# Patient Record
Sex: MALE | Race: WHITE | NOT HISPANIC OR LATINO | Employment: OTHER | URBAN - METROPOLITAN AREA
[De-identification: names, ages, dates, MRNs, and addresses within clinical notes are randomized per-mention and may not be internally consistent; named-entity substitution may affect disease eponyms.]

---

## 2017-03-16 ENCOUNTER — HISTORICAL (OUTPATIENT)
Dept: LAB | Facility: HOSPITAL | Age: 77
End: 2017-03-16

## 2017-07-27 ENCOUNTER — HISTORICAL (OUTPATIENT)
Dept: ADMINISTRATIVE | Facility: HOSPITAL | Age: 77
End: 2017-07-27

## 2017-08-15 ENCOUNTER — HISTORICAL (OUTPATIENT)
Dept: LAB | Facility: HOSPITAL | Age: 77
End: 2017-08-15

## 2017-08-17 LAB — FINAL CULTURE: NO GROWTH

## 2018-05-23 ENCOUNTER — HISTORICAL (OUTPATIENT)
Dept: ADMINISTRATIVE | Facility: HOSPITAL | Age: 78
End: 2018-05-23

## 2018-08-02 ENCOUNTER — HISTORICAL (OUTPATIENT)
Dept: ADMINISTRATIVE | Facility: HOSPITAL | Age: 78
End: 2018-08-02

## 2018-12-17 ENCOUNTER — HISTORICAL (OUTPATIENT)
Dept: ADMINISTRATIVE | Facility: HOSPITAL | Age: 78
End: 2018-12-17

## 2019-01-04 ENCOUNTER — HISTORICAL (OUTPATIENT)
Dept: ADMINISTRATIVE | Facility: HOSPITAL | Age: 79
End: 2019-01-04

## 2019-02-15 ENCOUNTER — HISTORICAL (OUTPATIENT)
Dept: RADIOLOGY | Facility: HOSPITAL | Age: 79
End: 2019-02-15

## 2019-05-22 ENCOUNTER — HISTORICAL (OUTPATIENT)
Dept: ADMINISTRATIVE | Facility: HOSPITAL | Age: 79
End: 2019-05-22

## 2019-06-04 ENCOUNTER — HISTORICAL (OUTPATIENT)
Dept: RADIOLOGY | Facility: HOSPITAL | Age: 79
End: 2019-06-04

## 2019-07-29 ENCOUNTER — HISTORICAL (OUTPATIENT)
Dept: ADMINISTRATIVE | Facility: HOSPITAL | Age: 79
End: 2019-07-29

## 2020-05-06 ENCOUNTER — HISTORICAL (OUTPATIENT)
Dept: ADMINISTRATIVE | Facility: HOSPITAL | Age: 80
End: 2020-05-06

## 2020-06-30 ENCOUNTER — HISTORICAL (OUTPATIENT)
Dept: RADIOLOGY | Facility: HOSPITAL | Age: 80
End: 2020-06-30

## 2021-05-14 ENCOUNTER — HISTORICAL (OUTPATIENT)
Dept: ADMINISTRATIVE | Facility: HOSPITAL | Age: 81
End: 2021-05-14

## 2021-05-18 ENCOUNTER — HISTORICAL (OUTPATIENT)
Dept: ADMINISTRATIVE | Facility: HOSPITAL | Age: 81
End: 2021-05-18

## 2022-04-09 ENCOUNTER — HISTORICAL (OUTPATIENT)
Dept: ADMINISTRATIVE | Facility: HOSPITAL | Age: 82
End: 2022-04-09

## 2022-04-26 VITALS
DIASTOLIC BLOOD PRESSURE: 78 MMHG | WEIGHT: 245.38 LBS | BODY MASS INDEX: 48.17 KG/M2 | SYSTOLIC BLOOD PRESSURE: 140 MMHG | HEIGHT: 60 IN

## 2022-07-11 ENCOUNTER — LAB VISIT (OUTPATIENT)
Dept: LAB | Facility: HOSPITAL | Age: 82
End: 2022-07-11
Attending: INTERNAL MEDICINE
Payer: MEDICARE

## 2022-07-11 DIAGNOSIS — Z12.5 SPECIAL SCREENING FOR MALIGNANT NEOPLASM OF PROSTATE: ICD-10-CM

## 2022-07-11 DIAGNOSIS — E11.9 DIABETES MELLITUS WITHOUT COMPLICATION: ICD-10-CM

## 2022-07-11 DIAGNOSIS — M81.0 SENILE OSTEOPOROSIS: ICD-10-CM

## 2022-07-11 DIAGNOSIS — Z00.00 ROUTINE GENERAL MEDICAL EXAMINATION AT A HEALTH CARE FACILITY: ICD-10-CM

## 2022-07-11 DIAGNOSIS — R94.8 NONSPECIFIC ABNORMAL RESULTS OF BASAL METABOLISM FUNCTION STUDY: ICD-10-CM

## 2022-07-11 DIAGNOSIS — N40.0 BENIGN PROSTATIC HYPERPLASIA, UNSPECIFIED WHETHER LOWER URINARY TRACT SYMPTOMS PRESENT: Primary | ICD-10-CM

## 2022-07-11 LAB
ALBUMIN SERPL-MCNC: 3.6 GM/DL (ref 3.4–4.8)
ALBUMIN/GLOB SERPL: 1.1 RATIO (ref 1.1–2)
ALP SERPL-CCNC: 106 UNIT/L (ref 40–150)
ALT SERPL-CCNC: 12 UNIT/L (ref 0–55)
AST SERPL-CCNC: 17 UNIT/L (ref 5–34)
BASOPHILS # BLD AUTO: 0.05 X10(3)/MCL (ref 0–0.2)
BASOPHILS NFR BLD AUTO: 0.7 %
BILIRUBIN DIRECT+TOT PNL SERPL-MCNC: 0.4 MG/DL
BUN SERPL-MCNC: 15.3 MG/DL (ref 8.4–25.7)
CALCIUM SERPL-MCNC: 9.2 MG/DL (ref 8.8–10)
CHLORIDE SERPL-SCNC: 115 MMOL/L (ref 98–107)
CHOLEST SERPL-MCNC: 124 MG/DL
CHOLEST/HDLC SERPL: 4 {RATIO} (ref 0–5)
CO2 SERPL-SCNC: 20 MMOL/L (ref 23–31)
CREAT SERPL-MCNC: 1.03 MG/DL (ref 0.73–1.18)
DEPRECATED CALCIDIOL+CALCIFEROL SERPL-MC: 22.2 NG/ML (ref 30–80)
EOSINOPHIL # BLD AUTO: 0.12 X10(3)/MCL (ref 0–0.9)
EOSINOPHIL NFR BLD AUTO: 1.7 %
ERYTHROCYTE [DISTWIDTH] IN BLOOD BY AUTOMATED COUNT: 15.2 % (ref 11.5–17)
ERYTHROCYTE [SEDIMENTATION RATE] IN BLOOD: 27 MM/HR (ref 0–15)
EST. AVERAGE GLUCOSE BLD GHB EST-MCNC: 111.2 MG/DL
GLOBULIN SER-MCNC: 3.2 GM/DL (ref 2.4–3.5)
GLUCOSE SERPL-MCNC: 73 MG/DL (ref 82–115)
HBA1C MFR BLD: 5.5 %
HCT VFR BLD AUTO: 43.9 % (ref 42–52)
HDLC SERPL-MCNC: 35 MG/DL (ref 35–60)
HGB BLD-MCNC: 13.6 GM/DL (ref 14–18)
IMM GRANULOCYTES # BLD AUTO: 0.02 X10(3)/MCL (ref 0–0.04)
IMM GRANULOCYTES NFR BLD AUTO: 0.3 %
LDLC SERPL CALC-MCNC: 62 MG/DL (ref 50–140)
LYMPHOCYTES # BLD AUTO: 1.34 X10(3)/MCL (ref 0.6–4.6)
LYMPHOCYTES NFR BLD AUTO: 19 %
MCH RBC QN AUTO: 30.4 PG (ref 27–31)
MCHC RBC AUTO-ENTMCNC: 31 MG/DL (ref 33–36)
MCV RBC AUTO: 98.2 FL (ref 80–94)
MONOCYTES # BLD AUTO: 0.79 X10(3)/MCL (ref 0.1–1.3)
MONOCYTES NFR BLD AUTO: 11.2 %
NEUTROPHILS # BLD AUTO: 4.7 X10(3)/MCL (ref 2.1–9.2)
NEUTROPHILS NFR BLD AUTO: 67.1 %
NRBC BLD AUTO-RTO: 0 %
PLATELET # BLD AUTO: 180 X10(3)/MCL (ref 130–400)
PMV BLD AUTO: 10.6 FL (ref 7.4–10.4)
POTASSIUM SERPL-SCNC: 4.8 MMOL/L (ref 3.5–5.1)
PROT SERPL-MCNC: 6.8 GM/DL (ref 5.8–7.6)
PSA SERPL-MCNC: 2.07 NG/ML
RBC # BLD AUTO: 4.47 X10(6)/MCL (ref 4.7–6.1)
RHEUMATOID FACT SERPL-ACNC: <13 IU/ML
SODIUM SERPL-SCNC: 145 MMOL/L (ref 136–145)
T4 FREE SERPL-MCNC: 1.19 NG/DL (ref 0.7–1.48)
TRIGL SERPL-MCNC: 136 MG/DL (ref 34–140)
TSH SERPL-ACNC: 0.63 UIU/ML (ref 0.35–4.94)
URATE SERPL-MCNC: 7.4 MG/DL (ref 3.5–7.2)
VLDLC SERPL CALC-MCNC: 27 MG/DL
WBC # SPEC AUTO: 7.1 X10(3)/MCL (ref 4.5–11.5)

## 2022-07-11 PROCEDURE — 84443 ASSAY THYROID STIM HORMONE: CPT

## 2022-07-11 PROCEDURE — 80053 COMPREHEN METABOLIC PANEL: CPT

## 2022-07-11 PROCEDURE — 86431 RHEUMATOID FACTOR QUANT: CPT

## 2022-07-11 PROCEDURE — 36415 COLL VENOUS BLD VENIPUNCTURE: CPT

## 2022-07-11 PROCEDURE — 84550 ASSAY OF BLOOD/URIC ACID: CPT

## 2022-07-11 PROCEDURE — 83036 HEMOGLOBIN GLYCOSYLATED A1C: CPT

## 2022-07-11 PROCEDURE — 84153 ASSAY OF PSA TOTAL: CPT

## 2022-07-11 PROCEDURE — 80061 LIPID PANEL: CPT

## 2022-07-11 PROCEDURE — 85025 COMPLETE CBC W/AUTO DIFF WBC: CPT

## 2022-07-11 PROCEDURE — 85651 RBC SED RATE NONAUTOMATED: CPT

## 2022-07-11 PROCEDURE — 82306 VITAMIN D 25 HYDROXY: CPT

## 2022-07-11 PROCEDURE — 84439 ASSAY OF FREE THYROXINE: CPT

## 2022-09-08 ENCOUNTER — HOSPITAL ENCOUNTER (INPATIENT)
Facility: HOSPITAL | Age: 82
LOS: 3 days | Discharge: HOME | DRG: 177 | End: 2022-09-11
Attending: EMERGENCY MEDICINE | Admitting: INTERNAL MEDICINE
Payer: MEDICARE

## 2022-09-08 DIAGNOSIS — R09.02 HYPOXIA: ICD-10-CM

## 2022-09-08 DIAGNOSIS — I48.91 ATRIAL FIBRILLATION: ICD-10-CM

## 2022-09-08 DIAGNOSIS — I50.33 ACUTE ON CHRONIC HEART FAILURE WITH PRESERVED EJECTION FRACTION (HFPEF): ICD-10-CM

## 2022-09-08 DIAGNOSIS — R06.02 SHORTNESS OF BREATH: ICD-10-CM

## 2022-09-08 DIAGNOSIS — U07.1 COVID-19: Primary | ICD-10-CM

## 2022-09-08 DIAGNOSIS — R79.89 ELEVATED BRAIN NATRIURETIC PEPTIDE (BNP) LEVEL: ICD-10-CM

## 2022-09-08 DIAGNOSIS — R07.9 CHEST PAIN: ICD-10-CM

## 2022-09-08 LAB
ABS NEUT (OLG): 3.54 X10(3)/MCL (ref 2.1–9.2)
ALBUMIN SERPL-MCNC: 3.7 GM/DL (ref 3.4–4.8)
ALBUMIN/GLOB SERPL: 0.9 RATIO (ref 1.1–2)
ALP SERPL-CCNC: 114 UNIT/L (ref 40–150)
ALT SERPL-CCNC: 24 UNIT/L (ref 0–55)
AST SERPL-CCNC: 41 UNIT/L (ref 5–34)
BASOPHILS NFR BLD MANUAL: 0.05 X10(3)/MCL (ref 0–0.2)
BASOPHILS NFR BLD MANUAL: 1 %
BILIRUBIN DIRECT+TOT PNL SERPL-MCNC: 0.4 MG/DL
BNP BLD-MCNC: 172.2 PG/ML
BUN SERPL-MCNC: 19.9 MG/DL (ref 8.4–25.7)
CALCIUM SERPL-MCNC: 9.5 MG/DL (ref 8.8–10)
CHLORIDE SERPL-SCNC: 105 MMOL/L (ref 98–107)
CO2 SERPL-SCNC: 19 MMOL/L (ref 23–31)
CREAT SERPL-MCNC: 1.1 MG/DL (ref 0.73–1.18)
CRP SERPL-MCNC: 41.6 MG/L
ERYTHROCYTE [DISTWIDTH] IN BLOOD BY AUTOMATED COUNT: 15.6 % (ref 11.5–17)
ERYTHROCYTE [SEDIMENTATION RATE] IN BLOOD: 58 MM/HR (ref 0–15)
FERRITIN SERPL-MCNC: 190.49 NG/ML (ref 21.81–274.66)
FLUAV AG UPPER RESP QL IA.RAPID: NOT DETECTED
FLUBV AG UPPER RESP QL IA.RAPID: NOT DETECTED
GFR SERPLBLD CREATININE-BSD FMLA CKD-EPI: >60 MLS/MIN/1.73/M2
GLOBULIN SER-MCNC: 4.1 GM/DL (ref 2.4–3.5)
GLUCOSE SERPL-MCNC: 60 MG/DL (ref 82–115)
HCT VFR BLD AUTO: 47 % (ref 42–52)
HGB BLD-MCNC: 14.7 GM/DL (ref 14–18)
IMM GRANULOCYTES # BLD AUTO: 0.02 X10(3)/MCL (ref 0–0.04)
IMM GRANULOCYTES NFR BLD AUTO: 0.4 %
INSTRUMENT WBC (OLG): 5.2 X10(3)/MCL
LYMPHOCYTES NFR BLD MANUAL: 0.83 X10(3)/MCL
LYMPHOCYTES NFR BLD MANUAL: 16 %
MCH RBC QN AUTO: 31.1 PG (ref 27–31)
MCHC RBC AUTO-ENTMCNC: 31.3 MG/DL (ref 33–36)
MCV RBC AUTO: 99.6 FL (ref 80–94)
MONOCYTES NFR BLD MANUAL: 0.78 X10(3)/MCL (ref 0.1–1.3)
MONOCYTES NFR BLD MANUAL: 15 %
NEUTROPHILS NFR BLD MANUAL: 68 %
NRBC BLD AUTO-RTO: 0 %
NRBC BLD MANUAL-RTO: 1 %
PLATELET # BLD AUTO: 164 X10(3)/MCL (ref 130–400)
PLATELET # BLD EST: NORMAL 10*3/UL
PMV BLD AUTO: 10.1 FL (ref 7.4–10.4)
POTASSIUM SERPL-SCNC: 3.9 MMOL/L (ref 3.5–5.1)
PROT SERPL-MCNC: 7.8 GM/DL (ref 5.8–7.6)
RBC # BLD AUTO: 4.72 X10(6)/MCL (ref 4.7–6.1)
RBC MORPH BLD: NORMAL
SARS-COV-2 RNA RESP QL NAA+PROBE: DETECTED
SODIUM SERPL-SCNC: 138 MMOL/L (ref 136–145)
TROPONIN I SERPL-MCNC: 0.03 NG/ML (ref 0–0.04)
WBC # SPEC AUTO: 5.2 X10(3)/MCL (ref 4.5–11.5)

## 2022-09-08 PROCEDURE — 85651 RBC SED RATE NONAUTOMATED: CPT | Performed by: EMERGENCY MEDICINE

## 2022-09-08 PROCEDURE — 36415 COLL VENOUS BLD VENIPUNCTURE: CPT | Performed by: PHYSICIAN ASSISTANT

## 2022-09-08 PROCEDURE — 25000242 PHARM REV CODE 250 ALT 637 W/ HCPCS: Performed by: EMERGENCY MEDICINE

## 2022-09-08 PROCEDURE — 36415 COLL VENOUS BLD VENIPUNCTURE: CPT | Performed by: EMERGENCY MEDICINE

## 2022-09-08 PROCEDURE — 11000001 HC ACUTE MED/SURG PRIVATE ROOM

## 2022-09-08 PROCEDURE — 63600175 PHARM REV CODE 636 W HCPCS: Mod: TB | Performed by: INTERNAL MEDICINE

## 2022-09-08 PROCEDURE — 96374 THER/PROPH/DIAG INJ IV PUSH: CPT

## 2022-09-08 PROCEDURE — 93005 ELECTROCARDIOGRAM TRACING: CPT

## 2022-09-08 PROCEDURE — 86140 C-REACTIVE PROTEIN: CPT | Performed by: EMERGENCY MEDICINE

## 2022-09-08 PROCEDURE — 94640 AIRWAY INHALATION TREATMENT: CPT

## 2022-09-08 PROCEDURE — 99285 EMERGENCY DEPT VISIT HI MDM: CPT | Mod: 25

## 2022-09-08 PROCEDURE — 63600175 PHARM REV CODE 636 W HCPCS: Performed by: EMERGENCY MEDICINE

## 2022-09-08 PROCEDURE — 82728 ASSAY OF FERRITIN: CPT | Performed by: INTERNAL MEDICINE

## 2022-09-08 PROCEDURE — 83880 ASSAY OF NATRIURETIC PEPTIDE: CPT | Performed by: PHYSICIAN ASSISTANT

## 2022-09-08 PROCEDURE — 84484 ASSAY OF TROPONIN QUANT: CPT | Performed by: PHYSICIAN ASSISTANT

## 2022-09-08 PROCEDURE — 25000003 PHARM REV CODE 250: Performed by: INTERNAL MEDICINE

## 2022-09-08 PROCEDURE — 21400001 HC TELEMETRY ROOM

## 2022-09-08 PROCEDURE — 80053 COMPREHEN METABOLIC PANEL: CPT | Performed by: PHYSICIAN ASSISTANT

## 2022-09-08 PROCEDURE — 93010 ELECTROCARDIOGRAM REPORT: CPT | Mod: ,,, | Performed by: INTERNAL MEDICINE

## 2022-09-08 PROCEDURE — 27000207 HC ISOLATION

## 2022-09-08 PROCEDURE — 93010 EKG 12-LEAD: ICD-10-PCS | Mod: ,,, | Performed by: INTERNAL MEDICINE

## 2022-09-08 PROCEDURE — 85025 COMPLETE CBC W/AUTO DIFF WBC: CPT | Performed by: PHYSICIAN ASSISTANT

## 2022-09-08 PROCEDURE — 87636 SARSCOV2 & INF A&B AMP PRB: CPT | Performed by: PHYSICIAN ASSISTANT

## 2022-09-08 RX ORDER — DILTIAZEM HYDROCHLORIDE 120 MG/1
CAPSULE, COATED, EXTENDED RELEASE ORAL
COMMUNITY
Start: 2022-04-06

## 2022-09-08 RX ORDER — FUROSEMIDE 10 MG/ML
40 INJECTION INTRAMUSCULAR; INTRAVENOUS ONCE
Status: COMPLETED | OUTPATIENT
Start: 2022-09-09 | End: 2022-09-09

## 2022-09-08 RX ORDER — SODIUM CHLORIDE 0.9 % (FLUSH) 0.9 %
10 SYRINGE (ML) INJECTION
Status: DISCONTINUED | OUTPATIENT
Start: 2022-09-08 | End: 2022-09-11 | Stop reason: HOSPADM

## 2022-09-08 RX ORDER — GUAIFENESIN/DEXTROMETHORPHAN 100-10MG/5
5 SYRUP ORAL EVERY 4 HOURS PRN
Status: DISCONTINUED | OUTPATIENT
Start: 2022-09-08 | End: 2022-09-11 | Stop reason: HOSPADM

## 2022-09-08 RX ORDER — POLYETHYLENE GLYCOL 3350 17 G/17G
17 POWDER, FOR SOLUTION ORAL 2 TIMES DAILY PRN
Status: DISCONTINUED | OUTPATIENT
Start: 2022-09-08 | End: 2022-09-11 | Stop reason: HOSPADM

## 2022-09-08 RX ORDER — AMLODIPINE AND BENAZEPRIL HYDROCHLORIDE 10; 40 MG/1; MG/1
1 CAPSULE ORAL DAILY
COMMUNITY
Start: 2022-07-14

## 2022-09-08 RX ORDER — MAG HYDROX/ALUMINUM HYD/SIMETH 200-200-20
30 SUSPENSION, ORAL (FINAL DOSE FORM) ORAL 4 TIMES DAILY PRN
Status: DISCONTINUED | OUTPATIENT
Start: 2022-09-08 | End: 2022-09-11 | Stop reason: HOSPADM

## 2022-09-08 RX ORDER — SIMETHICONE 80 MG
1 TABLET,CHEWABLE ORAL 4 TIMES DAILY PRN
Status: DISCONTINUED | OUTPATIENT
Start: 2022-09-08 | End: 2022-09-11 | Stop reason: HOSPADM

## 2022-09-08 RX ORDER — ACETAMINOPHEN 500 MG
1000 TABLET ORAL EVERY 6 HOURS PRN
Status: DISCONTINUED | OUTPATIENT
Start: 2022-09-08 | End: 2022-09-11 | Stop reason: HOSPADM

## 2022-09-08 RX ORDER — AMOXICILLIN 250 MG
1 CAPSULE ORAL 2 TIMES DAILY PRN
Status: DISCONTINUED | OUTPATIENT
Start: 2022-09-08 | End: 2022-09-11 | Stop reason: HOSPADM

## 2022-09-08 RX ORDER — LEVOTHYROXINE SODIUM 125 UG/1
125 TABLET ORAL DAILY
COMMUNITY
Start: 2022-08-25

## 2022-09-08 RX ORDER — PANTOPRAZOLE SODIUM 40 MG/1
40 TABLET, DELAYED RELEASE ORAL DAILY
COMMUNITY
Start: 2022-07-25

## 2022-09-08 RX ORDER — ZINC SULFATE 50(220)MG
220 CAPSULE ORAL DAILY
Status: DISCONTINUED | OUTPATIENT
Start: 2022-09-09 | End: 2022-09-11 | Stop reason: HOSPADM

## 2022-09-08 RX ORDER — IPRATROPIUM BROMIDE AND ALBUTEROL SULFATE 2.5; .5 MG/3ML; MG/3ML
3 SOLUTION RESPIRATORY (INHALATION)
Status: COMPLETED | OUTPATIENT
Start: 2022-09-08 | End: 2022-09-08

## 2022-09-08 RX ORDER — ALBUTEROL SULFATE 90 UG/1
2 AEROSOL, METERED RESPIRATORY (INHALATION) EVERY 4 HOURS PRN
Status: DISCONTINUED | OUTPATIENT
Start: 2022-09-08 | End: 2022-09-11 | Stop reason: HOSPADM

## 2022-09-08 RX ORDER — ACETAMINOPHEN 325 MG/1
650 TABLET ORAL EVERY 4 HOURS PRN
Status: DISCONTINUED | OUTPATIENT
Start: 2022-09-08 | End: 2022-09-11 | Stop reason: HOSPADM

## 2022-09-08 RX ORDER — DEXAMETHASONE SODIUM PHOSPHATE 4 MG/ML
6 INJECTION, SOLUTION INTRA-ARTICULAR; INTRALESIONAL; INTRAMUSCULAR; INTRAVENOUS; SOFT TISSUE
Status: COMPLETED | OUTPATIENT
Start: 2022-09-08 | End: 2022-09-08

## 2022-09-08 RX ORDER — ERGOCALCIFEROL 1.25 MG/1
50000 CAPSULE ORAL
Status: DISCONTINUED | OUTPATIENT
Start: 2022-09-09 | End: 2022-09-11 | Stop reason: HOSPADM

## 2022-09-08 RX ORDER — METOPROLOL SUCCINATE 100 MG/1
100 TABLET, EXTENDED RELEASE ORAL 2 TIMES DAILY
COMMUNITY
Start: 2022-07-14

## 2022-09-08 RX ORDER — IBUPROFEN 200 MG
16 TABLET ORAL
Status: DISCONTINUED | OUTPATIENT
Start: 2022-09-08 | End: 2022-09-11 | Stop reason: HOSPADM

## 2022-09-08 RX ORDER — ONDANSETRON 2 MG/ML
4 INJECTION INTRAMUSCULAR; INTRAVENOUS EVERY 4 HOURS PRN
Status: DISCONTINUED | OUTPATIENT
Start: 2022-09-08 | End: 2022-09-11 | Stop reason: HOSPADM

## 2022-09-08 RX ORDER — CLONIDINE HYDROCHLORIDE 0.2 MG/1
0.2 TABLET ORAL 3 TIMES DAILY PRN
Status: DISCONTINUED | OUTPATIENT
Start: 2022-09-08 | End: 2022-09-11 | Stop reason: HOSPADM

## 2022-09-08 RX ORDER — SIMVASTATIN 40 MG/1
40 TABLET, FILM COATED ORAL NIGHTLY
COMMUNITY
Start: 2022-07-11

## 2022-09-08 RX ORDER — GLIPIZIDE 10 MG/1
10 TABLET, FILM COATED, EXTENDED RELEASE ORAL 2 TIMES DAILY
COMMUNITY
Start: 2022-05-26

## 2022-09-08 RX ORDER — BENZONATATE 100 MG/1
200 CAPSULE ORAL 3 TIMES DAILY PRN
Status: DISCONTINUED | OUTPATIENT
Start: 2022-09-08 | End: 2022-09-11 | Stop reason: HOSPADM

## 2022-09-08 RX ORDER — BUMETANIDE 0.5 MG/1
0.5 TABLET ORAL DAILY
COMMUNITY
Start: 2022-08-19

## 2022-09-08 RX ORDER — PROCHLORPERAZINE EDISYLATE 5 MG/ML
5 INJECTION INTRAMUSCULAR; INTRAVENOUS EVERY 6 HOURS PRN
Status: DISCONTINUED | OUTPATIENT
Start: 2022-09-08 | End: 2022-09-11 | Stop reason: HOSPADM

## 2022-09-08 RX ORDER — GLUCAGON 1 MG
1 KIT INJECTION
Status: DISCONTINUED | OUTPATIENT
Start: 2022-09-08 | End: 2022-09-11 | Stop reason: HOSPADM

## 2022-09-08 RX ORDER — ASCORBIC ACID 500 MG
500 TABLET ORAL DAILY
Status: DISCONTINUED | OUTPATIENT
Start: 2022-09-09 | End: 2022-09-11 | Stop reason: HOSPADM

## 2022-09-08 RX ORDER — INSULIN ASPART 100 [IU]/ML
1-10 INJECTION, SOLUTION INTRAVENOUS; SUBCUTANEOUS
Status: DISCONTINUED | OUTPATIENT
Start: 2022-09-08 | End: 2022-09-11 | Stop reason: HOSPADM

## 2022-09-08 RX ORDER — IBUPROFEN 200 MG
24 TABLET ORAL
Status: DISCONTINUED | OUTPATIENT
Start: 2022-09-08 | End: 2022-09-11 | Stop reason: HOSPADM

## 2022-09-08 RX ORDER — TALC
6 POWDER (GRAM) TOPICAL NIGHTLY PRN
Status: DISCONTINUED | OUTPATIENT
Start: 2022-09-08 | End: 2022-09-11 | Stop reason: HOSPADM

## 2022-09-08 RX ORDER — PIOGLITAZONEHYDROCHLORIDE 30 MG/1
30 TABLET ORAL DAILY
COMMUNITY
Start: 2022-07-21

## 2022-09-08 RX ORDER — HYDRALAZINE HYDROCHLORIDE 20 MG/ML
10 INJECTION INTRAMUSCULAR; INTRAVENOUS EVERY 4 HOURS PRN
Status: DISCONTINUED | OUTPATIENT
Start: 2022-09-08 | End: 2022-09-11 | Stop reason: HOSPADM

## 2022-09-08 RX ADMIN — REMDESIVIR 200 MG: 100 INJECTION, POWDER, LYOPHILIZED, FOR SOLUTION INTRAVENOUS at 10:09

## 2022-09-08 RX ADMIN — IPRATROPIUM BROMIDE AND ALBUTEROL SULFATE 3 ML: 2.5; .5 SOLUTION RESPIRATORY (INHALATION) at 06:09

## 2022-09-08 RX ADMIN — DEXAMETHASONE SODIUM PHOSPHATE 6 MG: 4 INJECTION, SOLUTION INTRA-ARTICULAR; INTRALESIONAL; INTRAMUSCULAR; INTRAVENOUS; SOFT TISSUE at 04:09

## 2022-09-08 NOTE — ED PROVIDER NOTES
Encounter Date: 9/8/2022       History     Chief Complaint   Patient presents with    Shortness of Breath     Pt. C/o SOB and productive cough-yellow sputum x 3 weeks. Pt. Sent here by Dr. Ankur Cruz for possible pneumonia.      82 M h/o DM, HTN presents emergency department with shortness of breath and a cough productive of yellow sputum for about 3 weeks.  Worsening shortness of breath called his PCP was then med for possible pneumonia.  Patient was not vaccinated for COVID-19.  He is not having any chest pain.  His shortness of breath is worse cm round.  No nausea vomiting diarrhea or abdominal pain      Shortness of Breath  This is a new problem. Pertinent negatives include no fever, no neck pain, no cough, no chest pain, no vomiting and no abdominal pain. Associated medical issues do not include asthma or COPD.   Review of patient's allergies indicates:  No Known Allergies  Past Medical History:   Diagnosis Date    Diabetes mellitus     Hypertension      No past surgical history on file.  No family history on file.  Social History     Tobacco Use    Smoking status: Never    Smokeless tobacco: Never     Review of Systems   Constitutional:  Negative for chills and fever.   Respiratory:  Positive for shortness of breath. Negative for cough and chest tightness.    Cardiovascular:  Negative for chest pain.   Gastrointestinal:  Negative for abdominal pain, nausea and vomiting.   Musculoskeletal:  Negative for myalgias and neck pain.   Neurological:  Negative for syncope.   All other systems reviewed and are negative.    Physical Exam     Initial Vitals   BP Pulse Resp Temp SpO2   09/08/22 1242 09/08/22 1242 09/08/22 1242 09/08/22 1242 09/08/22 1246   101/60 68 20 98.1 °F (36.7 °C) 95 %      MAP       --                Physical Exam    Nursing note and vitals reviewed.  Constitutional: He appears well-developed and well-nourished. No distress.   HENT:   Head: Normocephalic and atraumatic.   Eyes: Conjunctivae are  normal.   Cardiovascular:  Normal rate.           Pulmonary/Chest: No respiratory distress. He has no wheezes. He has no rhonchi. He exhibits no tenderness.   Abdominal: Abdomen is soft. He exhibits no distension. There is no abdominal tenderness. There is no rebound and no guarding.   Musculoskeletal:         General: Normal range of motion.     Neurological: He is alert and oriented to person, place, and time. He has normal strength.   Skin: Skin is warm and dry.   Psychiatric: He has a normal mood and affect.       ED Course   Procedures  Labs Reviewed   COMPREHENSIVE METABOLIC PANEL - Abnormal; Notable for the following components:       Result Value    Carbon Dioxide 19 (*)     Glucose Level 60 (*)     Protein Total 7.8 (*)     Globulin 4.1 (*)     Albumin/Globulin Ratio 0.9 (*)     Aspartate Aminotransferase 41 (*)     All other components within normal limits   B-TYPE NATRIURETIC PEPTIDE - Abnormal; Notable for the following components:    Natriuretic Peptide 172.2 (*)     All other components within normal limits   CBC WITH DIFFERENTIAL - Abnormal; Notable for the following components:    MCV 99.6 (*)     MCH 31.1 (*)     MCHC 31.3 (*)     All other components within normal limits   COVID/FLU A&B PCR - Abnormal; Notable for the following components:    SARS-CoV-2 PCR Detected (*)     All other components within normal limits   C-REACTIVE PROTEIN - Abnormal; Notable for the following components:    C-Reactive Protein 41.60 (*)     All other components within normal limits   TROPONIN I - Normal   CBC W/ AUTO DIFFERENTIAL    Narrative:     The following orders were created for panel order CBC Auto Differential.  Procedure                               Abnormality         Status                     ---------                               -----------         ------                     CBC with Differential[[id number]]        Abnormal            Final result               Manual Differential[[id number]]                               Final result                 Please view results for these tests on the individual orders.   MANUAL DIFFERENTIAL   D DIMER, QUANTITATIVE   SEDIMENTATION RATE, AUTOMATED     EKG Readings: (Independently Interpreted)   EKG at 12:41 p.m..  70 per AFib.  No ST segment elevations or depressions   ECG Results              EKG 12-lead (Final result)  Result time 09/08/22 14:02:38      Final result by Interface, Lab In [facility] (09/08/22 14:02:38)                   Narrative:    Test Reason : R06.02,    Vent. Rate : 070 BPM     Atrial Rate : 000 BPM     P-R Int : 000 ms          QRS Dur : 096 ms      QT Int : 408 ms       P-R-T Axes : 000 004 071 degrees     QTc Int : 440 ms    Atrial fibrillation  Abnormal ECG  No previous ECGs available  Confirmed by Maude العلي MD ([id number]) on 9/8/2022 2:02:33 PM    Referred By:             Confirmed By:Maude العلي MD                                  Imaging Results              X-Ray Chest PA And Lateral (Final result)  Result time 09/08/22 13:22:28      Final result by Joel Lockwood MD (09/08/22 13:22:28)                   Impression:      No acute cardiopulmonary process.      Electronically signed by: Joel Lockwood  Date:    09/08/2022  Time:    13:22               Narrative:    EXAMINATION:  XR CHEST PA AND LATERAL    CLINICAL HISTORY:  Shortness of breath    TECHNIQUE:  Two views of the chest    COMPARISON:  01/04/2019    FINDINGS:  No focal opacification, pleural effusion, or pneumothorax.    The cardiomediastinal silhouette is within normal limits.    No acute osseous abnormality.                                       Medications   albuterol-ipratropium 2.5 mg-0.5 mg/3 mL nebulizer solution 3 mL (has no administration in time range)   dexamethasone injection 6 mg (has no administration in time range)                   ED Course as of 09/08/22 1718   Thu Sep 08, 2022   1625 Pt + for covid19. No infiltrate on cxr, satting 95% on room air.  Walking just  from the right side of the bed around the frontal left sub a bed is sats dropped to 89% any is tachypneic.  I do not appreciate any infiltrate on the x-ray.  Admit on Decadron. [LF]   1656 Discussed with Hospitalist  [LF]      ED Course User Index  [LF] Axel Lombardi MD             Clinical Impression:   Final diagnoses:  [R06.02] Shortness of breath  [U07.1] COVID-19 (Primary)  [R09.02] Hypoxia        ED Disposition Condition    Admit                 Axel Lombardi MD  09/08/22 [id number]

## 2022-09-08 NOTE — FIRST PROVIDER EVALUATION
Medical screening exam completed.  I have conducted a focused provider triage encounter, findings are as follows:    Brief history of present illness:  82 year old male presents to ED for shortness of breath and productive cough x 3 weeks; Sent from PCP's office for concern for pneumonia     Vitals:    09/08/22 1242   BP: 101/60   Pulse: 68   Resp: 20   Temp: 98.1 °F (36.7 °C)   TempSrc: Oral   Weight: 136.1 kg (300 lb)   Height: 5' (1.524 m)       Pertinent physical exam:  awake, alert     Brief workup plan:  cbc, cmp, ekg, troponin, bnp, chest x-ray, covid/flu swab     Preliminary workup initiated; this workup will be continued and followed by the physician or advanced practice provider that is assigned to the patient when roomed.

## 2022-09-09 PROBLEM — I50.33 ACUTE ON CHRONIC HEART FAILURE WITH PRESERVED EJECTION FRACTION (HFPEF): Status: ACTIVE | Noted: 2022-09-09

## 2022-09-09 LAB
AV INDEX (PROSTH): 0.41
AV MEAN GRADIENT: 8 MMHG
AV PEAK GRADIENT: 15 MMHG
AV VALVE AREA: 1.29 CM2
AV VELOCITY RATIO: 0.44
BSA FOR ECHO PROCEDURE: 2.5 M2
CV ECHO LV RWT: 0.38 CM
DOP CALC AO PEAK VEL: 1.93 M/S
DOP CALC AO VTI: 43.4 CM
DOP CALC LVOT AREA: 3.1 CM2
DOP CALC LVOT DIAMETER: 2 CM
DOP CALC LVOT PEAK VEL: 0.84 M/S
DOP CALC LVOT STROKE VOLUME: 55.89 CM3
DOP CALC MV VTI: 32.8 CM
DOP CALCLVOT PEAK VEL VTI: 17.8 CM
E WAVE DECELERATION TIME: 232 MSEC
E/A RATIO: 1.11
E/E' RATIO: 10.5 M/S
ECHO LV POSTERIOR WALL: 1 CM (ref 0.6–1.1)
EJECTION FRACTION: 60 %
FRACTIONAL SHORTENING: 10 % (ref 28–44)
INTERVENTRICULAR SEPTUM: 1.36 CM (ref 0.6–1.1)
LEFT ATRIUM SIZE: 3.4 CM
LEFT INTERNAL DIMENSION IN SYSTOLE: 4.66 CM (ref 2.1–4)
LEFT VENTRICLE DIASTOLIC VOLUME INDEX: 67.23 ML/M2
LEFT VENTRICLE DIASTOLIC VOLUME: 158 ML
LEFT VENTRICLE MASS INDEX: 103 G/M2
LEFT VENTRICLE SYSTOLIC VOLUME INDEX: 42.6 ML/M2
LEFT VENTRICLE SYSTOLIC VOLUME: 100 ML
LEFT VENTRICULAR INTERNAL DIMENSION IN DIASTOLE: 5.2 CM (ref 3.5–6)
LEFT VENTRICULAR MASS: 243.11 G
LV LATERAL E/E' RATIO: 7.64 M/S
LV SEPTAL E/E' RATIO: 16.8 M/S
LVOT MG: 2 MMHG
LVOT MV: 0.53 CM/S
MV MEAN GRADIENT: 1 MMHG
MV PEAK A VEL: 0.76 M/S
MV PEAK E VEL: 0.84 M/S
MV PEAK GRADIENT: 2 MMHG
MV STENOSIS PRESSURE HALF TIME: 111 MS
MV VALVE AREA BY CONTINUITY EQUATION: 1.7 CM2
MV VALVE AREA P 1/2 METHOD: 1.98 CM2
POCT GLUCOSE: 218 MG/DL (ref 70–110)
POCT GLUCOSE: 240 MG/DL (ref 70–110)
PV PEAK VELOCITY: 1.01 CM/S
RA PRESSURE: 3 MMHG
RIGHT VENTRICULAR END-DIASTOLIC DIMENSION: 2.6 CM
TDI LATERAL: 0.11 M/S
TDI SEPTAL: 0.05 M/S
TDI: 0.08 M/S
TRICUSPID ANNULAR PLANE SYSTOLIC EXCURSION: 1.96 CM

## 2022-09-09 PROCEDURE — 63600175 PHARM REV CODE 636 W HCPCS: Performed by: INTERNAL MEDICINE

## 2022-09-09 PROCEDURE — 25000003 PHARM REV CODE 250: Performed by: INTERNAL MEDICINE

## 2022-09-09 PROCEDURE — 27000207 HC ISOLATION

## 2022-09-09 PROCEDURE — 25000242 PHARM REV CODE 250 ALT 637 W/ HCPCS: Performed by: INTERNAL MEDICINE

## 2022-09-09 PROCEDURE — 21400001 HC TELEMETRY ROOM

## 2022-09-09 RX ORDER — METOPROLOL TARTRATE 1 MG/ML
5 INJECTION, SOLUTION INTRAVENOUS EVERY 6 HOURS PRN
Status: DISCONTINUED | OUTPATIENT
Start: 2022-09-09 | End: 2022-09-11 | Stop reason: HOSPADM

## 2022-09-09 RX ORDER — LISINOPRIL 20 MG/1
40 TABLET ORAL DAILY
Status: DISCONTINUED | OUTPATIENT
Start: 2022-09-09 | End: 2022-09-11 | Stop reason: HOSPADM

## 2022-09-09 RX ORDER — PRAVASTATIN SODIUM 40 MG/1
80 TABLET ORAL NIGHTLY
Status: DISCONTINUED | OUTPATIENT
Start: 2022-09-09 | End: 2022-09-11 | Stop reason: HOSPADM

## 2022-09-09 RX ORDER — LEVOTHYROXINE SODIUM 125 UG/1
125 TABLET ORAL DAILY
Status: DISCONTINUED | OUTPATIENT
Start: 2022-09-09 | End: 2022-09-11 | Stop reason: HOSPADM

## 2022-09-09 RX ORDER — FUROSEMIDE 10 MG/ML
40 INJECTION INTRAMUSCULAR; INTRAVENOUS EVERY 8 HOURS
Status: DISCONTINUED | OUTPATIENT
Start: 2022-09-09 | End: 2022-09-11 | Stop reason: HOSPADM

## 2022-09-09 RX ORDER — DILTIAZEM HYDROCHLORIDE 120 MG/1
120 CAPSULE, COATED, EXTENDED RELEASE ORAL DAILY
Status: DISCONTINUED | OUTPATIENT
Start: 2022-09-09 | End: 2022-09-11 | Stop reason: HOSPADM

## 2022-09-09 RX ORDER — PANTOPRAZOLE SODIUM 40 MG/1
40 TABLET, DELAYED RELEASE ORAL DAILY
Status: DISCONTINUED | OUTPATIENT
Start: 2022-09-09 | End: 2022-09-11 | Stop reason: HOSPADM

## 2022-09-09 RX ORDER — ENOXAPARIN SODIUM 150 MG/ML
1 INJECTION SUBCUTANEOUS
Status: DISCONTINUED | OUTPATIENT
Start: 2022-09-09 | End: 2022-09-10

## 2022-09-09 RX ORDER — METOPROLOL SUCCINATE 50 MG/1
100 TABLET, EXTENDED RELEASE ORAL 2 TIMES DAILY
Status: DISCONTINUED | OUTPATIENT
Start: 2022-09-09 | End: 2022-09-11 | Stop reason: HOSPADM

## 2022-09-09 RX ADMIN — ZINC SULFATE 220 MG (50 MG) CAPSULE 220 MG: CAPSULE at 09:09

## 2022-09-09 RX ADMIN — PRAVASTATIN SODIUM 80 MG: 40 TABLET ORAL at 10:09

## 2022-09-09 RX ADMIN — DILTIAZEM HYDROCHLORIDE 120 MG: 120 CAPSULE, COATED, EXTENDED RELEASE ORAL at 09:09

## 2022-09-09 RX ADMIN — FUROSEMIDE 40 MG: 10 INJECTION, SOLUTION INTRAMUSCULAR; INTRAVENOUS at 10:09

## 2022-09-09 RX ADMIN — OXYCODONE HYDROCHLORIDE AND ACETAMINOPHEN 500 MG: 500 TABLET ORAL at 09:09

## 2022-09-09 RX ADMIN — METOPROLOL SUCCINATE 100 MG: 50 TABLET, FILM COATED, EXTENDED RELEASE ORAL at 10:09

## 2022-09-09 RX ADMIN — FUROSEMIDE 40 MG: 10 INJECTION, SOLUTION INTRAMUSCULAR; INTRAVENOUS at 12:09

## 2022-09-09 RX ADMIN — GUAIFENESIN AND DEXTROMETHORPHAN 5 ML: 100; 10 SYRUP ORAL at 11:09

## 2022-09-09 RX ADMIN — ERGOCALCIFEROL 50000 UNITS: 1.25 CAPSULE ORAL at 09:09

## 2022-09-09 RX ADMIN — REMDESIVIR 100 MG: 100 INJECTION, POWDER, LYOPHILIZED, FOR SOLUTION INTRAVENOUS at 11:09

## 2022-09-09 RX ADMIN — LEVOTHYROXINE SODIUM 125 MCG: 125 TABLET ORAL at 09:09

## 2022-09-09 RX ADMIN — DEXAMETHASONE 6 MG: 2 TABLET ORAL at 09:09

## 2022-09-09 RX ADMIN — PANTOPRAZOLE SODIUM 40 MG: 40 TABLET, DELAYED RELEASE ORAL at 09:09

## 2022-09-09 RX ADMIN — METOPROLOL SUCCINATE 100 MG: 50 TABLET, FILM COATED, EXTENDED RELEASE ORAL at 09:09

## 2022-09-09 RX ADMIN — FUROSEMIDE 40 MG: 10 INJECTION, SOLUTION INTRAMUSCULAR; INTRAVENOUS at 06:09

## 2022-09-09 RX ADMIN — LISINOPRIL 40 MG: 20 TABLET ORAL at 09:09

## 2022-09-09 RX ADMIN — FUROSEMIDE 40 MG: 10 INJECTION, SOLUTION INTRAMUSCULAR; INTRAVENOUS at 02:09

## 2022-09-09 RX ADMIN — ENOXAPARIN SODIUM 135 MG: 150 INJECTION SUBCUTANEOUS at 06:09

## 2022-09-09 NOTE — H&P
[facility]  Hospital Medicine   History & Physical Note      Patient Name: Jules Kwong  : 1940  MRN: [id number]  PCP: Ankur rCuz MD  Admitting Service: Hospital Medicine  Attending Physician: Pantera Stewart MD  Admission Date: 2022 - IP- Inpatient   Length of Stay: 0  History source: EMR, patient and/or patient's family  Face-to-Face encounter date: 2022  Code status: Full    Chief Complaint   Shortness of Breath (Pt. C/o SOB and productive cough-yellow sputum x 3 weeks. Pt. Sent here by Dr. Ankur Cruz for possible pneumonia. )    History of Present Illness   This is an 82-year-old male with medical history of Chronic HFpEF, paroxysmal atrial fibrillation/flutter not on anticoagulation, T2DM, HTN, HLD present to the ED with complaint of shortness of breath and productive cough that started about 2-3 weeks ago.  Report symptoms started with dyspnea on exertion and worsening bilateral lower extremity swelling, over the past 2 weeks started having cough productive of yellowish sputum.  Denies chest pain, fever, chills, nausea, vomiting, abdominal pain, or diarrhea.    On arrival to ED, he was afebrile, normotensive and saturating 94% on room air.  EKG showed atrial fibrillation with controlled ventricular response.  Chest x-ray show clear lung fields without opacities.  . COVID-19 positive.    He was given dexamethasone and remdesivir referred to hospital medicine service for further evaluation and management.    ROS   Except as documented, all other systems reviewed and negative     Past Medical History   Chronic HFpEF ( ECHO 2018: LVEF 60-65%)  Paroxysmally atrial fibrillation/flutter (diagnosed , was not on anticoagulation at that time due to iron deficiency anemia and high fall risk).  T2DM  Hypertension  Hyperlipidemia  Hypothyroid  HX pituitary adenoma status post transsphenoidal resection    Past Surgical History   Transsphenoidal resection of  pituitary adenoma 2019  L4-L5 and L5-S1 decompression 2017    Social History     Social History     Tobacco Use    Smoking status: Never    Smokeless tobacco: Never   Substance Use Topics    Alcohol use: Not on file        Family History   Reviewed and negative    Allergies   Patient has no known allergies.    Home Medications     Prior to Admission medications    Medication Sig Start Date End Date Taking? Authorizing Provider   amLODIPine-benazepriL (LOTREL) 10-40 mg per capsule Take 1 capsule by mouth once daily. 7/14/22   Historical Provider   bumetanide (BUMEX) 0.5 MG Tab Take 0.5 mg by mouth once daily. 8/19/22   Historical Provider   diltiaZEM (CARDIZEM CD) 120 MG Cp24 Take by mouth. 4/6/22   Historical Provider   glipiZIDE (GLUCOTROL) 10 MG TR24 Take 10 mg by mouth 2 (two) times daily. 5/26/22   Historical Provider   levothyroxine (SYNTHROID) 125 MCG tablet Take 125 mcg by mouth once daily. 8/25/22   Historical Provider   metoprolol succinate (TOPROL-XL) 100 MG 24 hr tablet Take 100 mg by mouth 2 (two) times daily. 7/14/22   Historical Provider   pantoprazole (PROTONIX) 40 MG tablet Take 40 mg by mouth once daily. 7/25/22   Historical Provider   pioglitazone (ACTOS) 30 MG tablet Take 30 mg by mouth once daily. 7/21/22   Historical Provider   simvastatin (ZOCOR) 40 MG tablet Take 40 mg by mouth every evening. 7/11/22   Historical Provider        Inpatient Medications   Scheduled Meds   ascorbic acid (vitamin C)  500 mg Oral Daily    dexAMETHasone  6 mg Oral Daily    diltiaZEM  120 mg Oral Daily    enoxaparin  1 mg/kg (Dosing Weight) Subcutaneous Q12H    ergocalciferol  50,000 Units Oral Q3 Days    furosemide (LASIX) injection  40 mg Intravenous Q8H    levothyroxine  125 mcg Oral Daily    lisinopriL  40 mg Oral Daily    metoprolol succinate  100 mg Oral BID    pantoprazole  40 mg Oral Daily    pravastatin  80 mg Oral QHS    remdesivir infusion  100 mg Intravenous Daily    zinc sulfate  220 mg Oral Daily      Continuous Infusions    PRN Meds  acetaminophen, acetaminophen, albuterol, aluminum-magnesium hydroxide-simethicone, benzonatate, cloNIDine, dextromethorphan-guaiFENesin  mg/5 ml, dextrose 10%, dextrose 10%, glucagon (human recombinant), glucose, glucose, hydrALAZINE, insulin aspart U-100, melatonin, ondansetron, polyethylene glycol, prochlorperazine, senna-docusate 8.6-50 mg, simethicone, sodium chloride 0.9%    Physical Exam   Vital Signs  Temp:  [98.1 °F (36.7 °C)]   Pulse:  [68-99]   Resp:  [18-20]   BP: (101-163)/()   SpO2:  [93 %-98 %]    General: Appears comfortable  HEENT: NC/AT  Neck:  No JVD  Chest:  Clear to auscultation anteriorly, faint crackle at the bases posteriorly  CVS: Irregular rhythm. Normal S1/S2. +3 pedal edema  Abdomen: nondistended, normoactive BS, soft and non-tender.  MSK: No obvious deformity or joint swelling  Skin: Warm and dry  Neuro: AAOx3, no focal neurological deficit  Psych: Cooperative    Labs     Recent Labs     09/08/22  1306   WBC 5.2   RBC 4.72   HGB 14.7   HCT 47.0   MCV 99.6*   MCH 31.1*   MCHC 31.3*   RDW 15.6           Recent Labs     09/08/22  1306      K 3.9   CHLORIDE 105   CO2 19*   BUN 19.9   CREATININE 1.10   GLUCOSE 60*   CALCIUM 9.5   ALBUMIN 3.7   GLOBULIN 4.1*   ALKPHOS 114   ALT 24   AST 41*   BILITOT 0.4   CRP 41.60*     Recent Labs     09/08/22  1306   .2*   TROPONINI 0.028            Imaging     X-Ray Chest PA And Lateral   Final Result      No acute cardiopulmonary process.         Electronically signed by: Joel Lockwood   Date:    09/08/2022   Time:    13:22        Assessment & Plan   Acute on chronic heart failure with preserved ejection fraction  COVID-19 pneumonitis  Paroxysmal atrial fibrillation--currently CVR, ([id number]= [id number])    Plan:    Isolation precautions, continue remdesivir plus dexamethasone, check D-dimer if elevated will get CTA chest otherwise CT chest without contrast.  Lasix 40 mg IV Q8H.  Accurate  I/O, daily weight, keep K >4, Mg >2.  Wean off nasal cannula for saturation above 92%  Incentive spirometry  Home medication reviewed and resumed, hold glipizide  VTE Prophylaxis:  Start enoxaparin 1mg/kg q12h (fall risk alone is not a contraindication to anticoagulation and currently he is not anemic)    Critical care time:  35 minutes  Critical care diagnosis:  Acute on chronic heart failure with preserved ejection fraction    Pantera Stewart MD  Internal Medicine

## 2022-09-09 NOTE — PROGRESS NOTES
"[facility]  Hospital Medicine Progress Note        Chief Complaint: Inpatient Follow-up for COVID    HPI:   This is an 82-year-old male with medical history of Chronic HFpEF, paroxysmal atrial fibrillation/flutter not on anticoagulation, T2DM, HTN, HLD present to the ED with complaint of shortness of breath and productive cough that started about 2-3 weeks ago.  Report symptoms started with dyspnea on exertion and worsening bilateral lower extremity swelling, over the past 2 weeks started having cough productive of yellowish sputum.  Denies chest pain, fever, chills, nausea, vomiting, abdominal pain, or diarrhea.     On arrival to ED, he was afebrile, normotensive and saturating 94% on room air.  EKG showed atrial fibrillation with controlled ventricular response.  Chest x-ray show clear lung fields without opacities.  . COVID-19 positive.     He was given dexamethasone and remdesivir referred to hospital medicine service for further evaluation and management.    Interval Hx:   Afebrile.  Hemodynamically stable.  Alert, oriented, comfortably resting in the chair.  Except for intermittent cough and shortness of breath on exertion he has no new complaints or concerns.  Denies any smoking at baseline. D-dimer pending    Objective/physical exam:  Vitals:    09/09/22 0041 09/09/22 0240 09/09/22 0242 09/09/22 0455   BP: 137/72  137/72 134/75   Pulse: 92  92 (!) 121   Resp: 20  20 18   Temp: 98 °F (36.7 °C)  98 °F (36.7 °C) 98.4 °F (36.9 °C)   TempSrc: Oral   Oral   SpO2: (!) 93%  (!) 93% (!) 92%   Weight:  136.1 kg (300 lb) 136.1 kg (300 lb)    Height:  5' 5" (1.651 m) 5' 5" (1.651 m)      General: In no acute distress, afebrile  Respiratory: Clear to auscultation bilaterally  Cardiovascular: S1, S2, no appreciable murmur  Abdomen: Soft, nontender, BS +  MSK: Warm, no lower extremity edema, no clubbing or cyanosis  Neurologic: Alert and oriented x4, moving all extremities with good " strength     Lab Results   Component Value Date     09/08/2022    K 3.9 09/08/2022    CO2 19 (L) 09/08/2022    BUN 19.9 09/08/2022    CREATININE 1.10 09/08/2022    CALCIUM 9.5 09/08/2022    EGFRNONAA >60 07/11/2022      Lab Results   Component Value Date    ALT 24 09/08/2022    AST 41 (H) 09/08/2022    ALKPHOS 114 09/08/2022    BILITOT 0.4 09/08/2022      Lab Results   Component Value Date    WBC 5.2 09/08/2022    HGB 14.7 09/08/2022    HCT 47.0 09/08/2022    MCV 99.6 (H) 09/08/2022     09/08/2022           Medications:   ascorbic acid (vitamin C)  500 mg Oral Daily    dexAMETHasone  6 mg Oral Daily    diltiaZEM  120 mg Oral Daily    enoxaparin  1 mg/kg (Dosing Weight) Subcutaneous Q12H    ergocalciferol  50,000 Units Oral Q3 Days    furosemide (LASIX) injection  40 mg Intravenous Q8H    levothyroxine  125 mcg Oral Daily    lisinopriL  40 mg Oral Daily    metoprolol succinate  100 mg Oral BID    pantoprazole  40 mg Oral Daily    pravastatin  80 mg Oral QHS    remdesivir infusion  100 mg Intravenous Daily    zinc sulfate  220 mg Oral Daily      acetaminophen, acetaminophen, albuterol, aluminum-magnesium hydroxide-simethicone, benzonatate, cloNIDine, dextromethorphan-guaiFENesin  mg/5 ml, dextrose 10%, dextrose 10%, glucagon (human recombinant), glucose, glucose, hydrALAZINE, insulin aspart U-100, melatonin, metoprolol, ondansetron, polyethylene glycol, prochlorperazine, senna-docusate 8.6-50 mg, simethicone, sodium chloride 0.9%     Assessment/Plan:    Acute on chronic heart failure with preserved ejection fraction  COVID-19 pneumonitis  Paroxysmal atrial fibrillation--currently CVR, ([id number]= 5), not on AC    Hx: Obesity, T2DM, HTN, HLD    Plan:  -follow GT.  Continue Lasix.  Monitor I's and O's and electrolytes  - continue COVID-19 treatment protocol with day to remdesivir and day to dexamethasone.    -currently on full-dose anticoagulation with weight based Lovenox  -continue supportive care  with antihistamine X, antitussives.    -encourage incentive spirometer use, ambulation as tolerated, pulmonary toileting, prone positioning.  Wean off oxygen as tolerated  -D-dimer pending.  Obtain CT angiogram if D-dimer is elevated.  -other home medications will be reviewed and renewed.  Glipizide on hold     Protonix  Full code   Labs for tomorrow    Critical care diagnosis:  Acute on chronic CHF, COVID-19 pneumonitis  Critical care time: 50 minutes        Barry Packer MD

## 2022-09-09 NOTE — PROGRESS NOTES
"Inpatient Nutrition Evaluation    Admit Date: 9/8/2022   Total duration of encounter: 1 day    Nutrition Recommendation/Prescription     Continue diet as ordered.   Add Boost Glucose Control (250kcals, 14g protein per serving) to encourage po intake.     Nutrition Assessment     Chart Review    Reason Seen: continuous nutrition monitoring    Diagnosis:  Acute on chronic heart failure with preserved ejection fraction  COVID-19 pneumonitis  Paroxysmal atrial fibrillation    Relevant Medical History:  Chronic HFpEF, paroxysmal atrial fibrillation/flutter not on anticoagulation, T2DM, HTN, HLD     Nutrition-Related Medications: ascorbic acid, dexamethasone, vit D, furosemide, levothyroxine, pantoprazole    Nutrition-Related Labs:  9/8 CO2 19, Gluc 60, Total pro 7.8, AST 41     Diet Order: Diet diabetic  Oral Supplement Order: none at this time  Appetite/Oral Intake: fair/50-75% of meals  Factors Affecting Nutritional Intake: decreased appetite  Food/Mosque/Cultural Preferences: none reported       Wound(s):   none documented at this time    Comments    9/9 Pt with decreased appetite prior to admit s/t SOB and cough. No nausea, vomiting or abdominal pain. Will send Boost Glucose Control with meals to encourage increased po intake as tolerated. Anticipate wt loss s/t fluid retention.     Anthropometrics    Height: 5' 5" (165.1 cm) Height Method: Stated  Last Weight: (!) 136.1 kg (300 lb 0.7 oz) (09/09/22 0910) Weight Method: Bed Scale  BMI (Calculated): 49.9  BMI Classification: obese grade III (BMI >/=40)        Ideal Body Weight (IBW), Male: 136 lb  % Ideal Body Weight, Male (lb): 220.63 %                   Wt Readings from Last 5 Encounters:   09/09/22 (!) 136.1 kg (300 lb 0.7 oz)   07/27/17 111.3 kg (245 lb 6 oz)     Weight Change(s) Since Admission:  Admit Weight: 136.1 kg (300 lb) (09/08/22 1242)  9/8/22: 136.1kg admit wt, +3 pedal edema anticipate wt loss s/t fluid    Patient Education    Not " applicable.    Monitoring & Evaluation     Dietitian will monitor food and beverage intake and electrolyte/renal panel.  Nutrition Risk/Follow-Up: low (follow-up in 5-7 days)  Patients assigned 'low nutrition risk' status do not qualify for a full nutritional assessment but will be monitored and re-evaluated in a 5-7 day time period.

## 2022-09-10 LAB
ALBUMIN SERPL-MCNC: 3.3 GM/DL (ref 3.4–4.8)
ALBUMIN/GLOB SERPL: 1 RATIO (ref 1.1–2)
ALP SERPL-CCNC: 97 UNIT/L (ref 40–150)
ALT SERPL-CCNC: 23 UNIT/L (ref 0–55)
AST SERPL-CCNC: 31 UNIT/L (ref 5–34)
BASOPHILS # BLD AUTO: 0.01 X10(3)/MCL (ref 0–0.2)
BASOPHILS NFR BLD AUTO: 0.1 %
BILIRUBIN DIRECT+TOT PNL SERPL-MCNC: 0.3 MG/DL
BUN SERPL-MCNC: 28.9 MG/DL (ref 8.4–25.7)
CALCIUM SERPL-MCNC: 9.3 MG/DL (ref 8.8–10)
CHLORIDE SERPL-SCNC: 108 MMOL/L (ref 98–107)
CO2 SERPL-SCNC: 24 MMOL/L (ref 23–31)
CREAT SERPL-MCNC: 1.09 MG/DL (ref 0.73–1.18)
D DIMER PPP IA.FEU-MCNC: 0.68 UG/ML FEU (ref 0–0.5)
DEPRECATED CALCIDIOL+CALCIFEROL SERPL-MC: 22.9 NG/ML (ref 30–80)
EOSINOPHIL # BLD AUTO: 0 X10(3)/MCL (ref 0–0.9)
EOSINOPHIL NFR BLD AUTO: 0 %
ERYTHROCYTE [DISTWIDTH] IN BLOOD BY AUTOMATED COUNT: 15.1 % (ref 11.5–17)
ERYTHROCYTE [DISTWIDTH] IN BLOOD BY AUTOMATED COUNT: 15.2 % (ref 11.5–17)
GFR SERPLBLD CREATININE-BSD FMLA CKD-EPI: >60 MLS/MIN/1.73/M2
GLOBULIN SER-MCNC: 3.4 GM/DL (ref 2.4–3.5)
GLUCOSE SERPL-MCNC: 197 MG/DL (ref 82–115)
HCT VFR BLD AUTO: 46.8 % (ref 42–52)
HCT VFR BLD AUTO: 46.9 % (ref 42–52)
HGB BLD-MCNC: 14.7 GM/DL (ref 14–18)
HGB BLD-MCNC: 14.9 GM/DL (ref 14–18)
IMM GRANULOCYTES # BLD AUTO: 0.09 X10(3)/MCL (ref 0–0.04)
IMM GRANULOCYTES NFR BLD AUTO: 0.8 %
LYMPHOCYTES # BLD AUTO: 0.76 X10(3)/MCL (ref 0.6–4.6)
LYMPHOCYTES NFR BLD AUTO: 6.5 %
MAGNESIUM SERPL-MCNC: 2 MG/DL (ref 1.6–2.6)
MCH RBC QN AUTO: 30.8 PG (ref 27–31)
MCH RBC QN AUTO: 30.8 PG (ref 27–31)
MCHC RBC AUTO-ENTMCNC: 31.4 MG/DL (ref 33–36)
MCHC RBC AUTO-ENTMCNC: 31.8 MG/DL (ref 33–36)
MCV RBC AUTO: 97.1 FL (ref 80–94)
MCV RBC AUTO: 98.1 FL (ref 80–94)
MONOCYTES # BLD AUTO: 0.65 X10(3)/MCL (ref 0.1–1.3)
MONOCYTES NFR BLD AUTO: 5.6 %
NEUTROPHILS # BLD AUTO: 10.2 X10(3)/MCL (ref 2.1–9.2)
NEUTROPHILS NFR BLD AUTO: 87 %
NRBC BLD AUTO-RTO: 0 %
NRBC BLD AUTO-RTO: 0 %
PHOSPHATE SERPL-MCNC: 2.2 MG/DL (ref 2.3–4.7)
PLATELET # BLD AUTO: 153 X10(3)/MCL (ref 130–400)
PLATELET # BLD AUTO: 158 X10(3)/MCL (ref 130–400)
PMV BLD AUTO: 10 FL (ref 7.4–10.4)
PMV BLD AUTO: 9.4 FL (ref 7.4–10.4)
POCT GLUCOSE: 173 MG/DL (ref 70–110)
POCT GLUCOSE: 207 MG/DL (ref 70–110)
POCT GLUCOSE: 239 MG/DL (ref 70–110)
POTASSIUM SERPL-SCNC: 4.2 MMOL/L (ref 3.5–5.1)
PROT SERPL-MCNC: 6.7 GM/DL (ref 5.8–7.6)
RBC # BLD AUTO: 4.77 X10(6)/MCL (ref 4.7–6.1)
RBC # BLD AUTO: 4.83 X10(6)/MCL (ref 4.7–6.1)
SODIUM SERPL-SCNC: 139 MMOL/L (ref 136–145)
WBC # SPEC AUTO: 11.3 X10(3)/MCL (ref 4.5–11.5)
WBC # SPEC AUTO: 11.7 X10(3)/MCL (ref 4.5–11.5)

## 2022-09-10 PROCEDURE — 84100 ASSAY OF PHOSPHORUS: CPT | Performed by: INTERNAL MEDICINE

## 2022-09-10 PROCEDURE — 85379 FIBRIN DEGRADATION QUANT: CPT | Performed by: INTERNAL MEDICINE

## 2022-09-10 PROCEDURE — 63600175 PHARM REV CODE 636 W HCPCS: Performed by: INTERNAL MEDICINE

## 2022-09-10 PROCEDURE — 85025 COMPLETE CBC W/AUTO DIFF WBC: CPT | Performed by: INTERNAL MEDICINE

## 2022-09-10 PROCEDURE — 27000207 HC ISOLATION

## 2022-09-10 PROCEDURE — 21400001 HC TELEMETRY ROOM

## 2022-09-10 PROCEDURE — 85027 COMPLETE CBC AUTOMATED: CPT | Performed by: INTERNAL MEDICINE

## 2022-09-10 PROCEDURE — 82306 VITAMIN D 25 HYDROXY: CPT | Performed by: INTERNAL MEDICINE

## 2022-09-10 PROCEDURE — 25000003 PHARM REV CODE 250: Performed by: INTERNAL MEDICINE

## 2022-09-10 PROCEDURE — 83735 ASSAY OF MAGNESIUM: CPT | Performed by: INTERNAL MEDICINE

## 2022-09-10 PROCEDURE — 36415 COLL VENOUS BLD VENIPUNCTURE: CPT | Performed by: INTERNAL MEDICINE

## 2022-09-10 PROCEDURE — 25500020 PHARM REV CODE 255: Performed by: INTERNAL MEDICINE

## 2022-09-10 PROCEDURE — 25000242 PHARM REV CODE 250 ALT 637 W/ HCPCS: Performed by: INTERNAL MEDICINE

## 2022-09-10 PROCEDURE — 80053 COMPREHEN METABOLIC PANEL: CPT | Performed by: INTERNAL MEDICINE

## 2022-09-10 RX ORDER — ENOXAPARIN SODIUM 100 MG/ML
40 INJECTION SUBCUTANEOUS EVERY 24 HOURS
Status: DISCONTINUED | OUTPATIENT
Start: 2022-09-10 | End: 2022-09-11 | Stop reason: HOSPADM

## 2022-09-10 RX ADMIN — LISINOPRIL 40 MG: 20 TABLET ORAL at 08:09

## 2022-09-10 RX ADMIN — METOPROLOL SUCCINATE 100 MG: 50 TABLET, FILM COATED, EXTENDED RELEASE ORAL at 09:09

## 2022-09-10 RX ADMIN — REMDESIVIR 100 MG: 100 INJECTION, POWDER, LYOPHILIZED, FOR SOLUTION INTRAVENOUS at 09:09

## 2022-09-10 RX ADMIN — IOPAMIDOL 100 ML: 755 INJECTION, SOLUTION INTRAVENOUS at 02:09

## 2022-09-10 RX ADMIN — FUROSEMIDE 40 MG: 10 INJECTION, SOLUTION INTRAMUSCULAR; INTRAVENOUS at 02:09

## 2022-09-10 RX ADMIN — OXYCODONE HYDROCHLORIDE AND ACETAMINOPHEN 500 MG: 500 TABLET ORAL at 08:09

## 2022-09-10 RX ADMIN — METOPROLOL SUCCINATE 100 MG: 50 TABLET, FILM COATED, EXTENDED RELEASE ORAL at 08:09

## 2022-09-10 RX ADMIN — LEVOTHYROXINE SODIUM 125 MCG: 125 TABLET ORAL at 08:09

## 2022-09-10 RX ADMIN — PRAVASTATIN SODIUM 80 MG: 40 TABLET ORAL at 09:09

## 2022-09-10 RX ADMIN — FUROSEMIDE 40 MG: 10 INJECTION, SOLUTION INTRAMUSCULAR; INTRAVENOUS at 09:09

## 2022-09-10 RX ADMIN — ENOXAPARIN SODIUM 40 MG: 40 INJECTION SUBCUTANEOUS at 09:09

## 2022-09-10 RX ADMIN — DILTIAZEM HYDROCHLORIDE 120 MG: 120 CAPSULE, COATED, EXTENDED RELEASE ORAL at 08:09

## 2022-09-10 RX ADMIN — ZINC SULFATE 220 MG (50 MG) CAPSULE 220 MG: CAPSULE at 08:09

## 2022-09-10 RX ADMIN — DEXAMETHASONE 6 MG: 2 TABLET ORAL at 08:09

## 2022-09-10 RX ADMIN — ENOXAPARIN SODIUM 135 MG: 150 INJECTION SUBCUTANEOUS at 06:09

## 2022-09-10 RX ADMIN — PANTOPRAZOLE SODIUM 40 MG: 40 TABLET, DELAYED RELEASE ORAL at 08:09

## 2022-09-10 NOTE — PROGRESS NOTES
"[facility]  Hospital Medicine Progress Note        Chief Complaint: Inpatient Follow-up for COVID    HPI:   This is an 82-year-old male with medical history of Chronic HFpEF, paroxysmal atrial fibrillation/flutter not on anticoagulation, T2DM, HTN, HLD present to the ED with complaint of shortness of breath and productive cough that started about 2-3 weeks ago.  Report symptoms started with dyspnea on exertion and worsening bilateral lower extremity swelling, over the past 2 weeks started having cough productive of yellowish sputum.  Denies chest pain, fever, chills, nausea, vomiting, abdominal pain, or diarrhea.     On arrival to ED, he was afebrile, normotensive and saturating 94% on room air.  EKG showed atrial fibrillation with controlled ventricular response.  Chest x-ray show clear lung fields without opacities.  . COVID-19 positive.     He was given dexamethasone and remdesivir referred to hospital medicine service for further evaluation and management.    Interval Hx:     Afebrile overnight.  Currently saturating well on minimal nasal cannula oxygen.  He denies any new complaints or concerns.  Wants to go home.  Refusing labs for the past couple days.  Agreeable to get labs today.    Objective/physical exam:  Vitals:    09/09/22 0910 09/09/22 1559 09/10/22 0048 09/10/22 0607   BP: 123/71 127/67 (!) 144/71 126/69   Pulse: 68 60 88 63   Resp:  18 18 18   Temp:  98.1 °F (36.7 °C) 98.4 °F (36.9 °C) 97.9 °F (36.6 °C)   TempSrc:  Oral Oral Oral   SpO2:  (!) 93% (!) 93% (!) 92%   Weight: (!) 136.1 kg (300 lb 0.7 oz)      Height: 5' 5" (1.651 m)        General: In no acute distress, afebrile  Respiratory: Clear to auscultation bilaterally  Cardiovascular: S1, S2, no appreciable murmur  Abdomen: Soft, nontender, BS +  MSK: Warm, no lower extremity edema, no clubbing or cyanosis  Neurologic: Alert and oriented x4, moving all extremities with good strength     Lab Results "   Component Value Date     09/08/2022    K 3.9 09/08/2022    CO2 19 (L) 09/08/2022    BUN 19.9 09/08/2022    CREATININE 1.10 09/08/2022    CALCIUM 9.5 09/08/2022    EGFRNONAA >60 07/11/2022      Lab Results   Component Value Date    ALT 24 09/08/2022    AST 41 (H) 09/08/2022    ALKPHOS 114 09/08/2022    BILITOT 0.4 09/08/2022      Lab Results   Component Value Date    WBC 5.2 09/08/2022    HGB 14.7 09/08/2022    HCT 47.0 09/08/2022    MCV 99.6 (H) 09/08/2022     09/08/2022           Medications:   ascorbic acid (vitamin C)  500 mg Oral Daily    dexAMETHasone  6 mg Oral Daily    diltiaZEM  120 mg Oral Daily    enoxaparin  1 mg/kg (Dosing Weight) Subcutaneous Q12H    ergocalciferol  50,000 Units Oral Q3 Days    furosemide (LASIX) injection  40 mg Intravenous Q8H    levothyroxine  125 mcg Oral Daily    lisinopriL  40 mg Oral Daily    metoprolol succinate  100 mg Oral BID    pantoprazole  40 mg Oral Daily    pravastatin  80 mg Oral QHS    remdesivir infusion  100 mg Intravenous Daily    zinc sulfate  220 mg Oral Daily      acetaminophen, acetaminophen, albuterol, aluminum-magnesium hydroxide-simethicone, benzonatate, cloNIDine, dextromethorphan-guaiFENesin  mg/5 ml, dextrose 10%, dextrose 10%, glucagon (human recombinant), glucose, glucose, hydrALAZINE, insulin aspart U-100, melatonin, metoprolol, ondansetron, polyethylene glycol, prochlorperazine, senna-docusate 8.6-50 mg, simethicone, sodium chloride 0.9%     Assessment/Plan:    Acute on chronic heart failure with preserved ejection fraction  COVID-19 pneumonitis  Paroxysmal atrial fibrillation--currently CVR, ([id number]= 5), not on AC    Hx: Obesity, T2DM, HTN, HLD    Plan:  -TTE showed intact EF, normal systolic function.  Continue Lasix.  Monitor I's and O's and electrolytes  - continue COVID-19 treatment protocol with day 3 remdesivir and day 3 dexamethasone.    -currently on full-dose anticoagulation with weight based Lovenox. Check d dimer.  Obtain CT angiogram if D-dimer is elevated.    -continue supportive care with antihistamine X, antitussives.    -encourage incentive spirometer use, ambulation as tolerated, pulmonary toileting, prone positioning.  Wean off oxygen as tolerated  -other home medications will be reviewed and renewed.  Glipizide on hold     Protonix  Full code   Labs for tomorrow     Critical care diagnosis:  Acute on chronic CHF, COVID-19 pneumonitis  Critical care time: 50 minutes        Barry Packer MD

## 2022-09-11 VITALS
BODY MASS INDEX: 49.99 KG/M2 | RESPIRATION RATE: 18 BRPM | HEART RATE: 82 BPM | TEMPERATURE: 98 F | DIASTOLIC BLOOD PRESSURE: 79 MMHG | WEIGHT: 300.06 LBS | SYSTOLIC BLOOD PRESSURE: 116 MMHG | HEIGHT: 65 IN | OXYGEN SATURATION: 95 %

## 2022-09-11 LAB — POCT GLUCOSE: 170 MG/DL (ref 70–110)

## 2022-09-11 PROCEDURE — 25000242 PHARM REV CODE 250 ALT 637 W/ HCPCS: Performed by: INTERNAL MEDICINE

## 2022-09-11 PROCEDURE — 27000221 HC OXYGEN, UP TO 24 HOURS

## 2022-09-11 PROCEDURE — 25000003 PHARM REV CODE 250: Performed by: INTERNAL MEDICINE

## 2022-09-11 PROCEDURE — 63600175 PHARM REV CODE 636 W HCPCS: Performed by: INTERNAL MEDICINE

## 2022-09-11 PROCEDURE — 94761 N-INVAS EAR/PLS OXIMETRY MLT: CPT

## 2022-09-11 RX ORDER — GUAIFENESIN/DEXTROMETHORPHAN 100-10MG/5
5 SYRUP ORAL EVERY 4 HOURS PRN
Qty: 118 ML | Refills: 0 | Status: SHIPPED | OUTPATIENT
Start: 2022-09-11 | End: 2022-09-21

## 2022-09-11 RX ORDER — BENZONATATE 200 MG/1
200 CAPSULE ORAL 3 TIMES DAILY PRN
Qty: 20 CAPSULE | Refills: 0 | Status: SHIPPED | OUTPATIENT
Start: 2022-09-11 | End: 2022-09-21

## 2022-09-11 RX ORDER — DEXAMETHASONE 6 MG/1
6 TABLET ORAL DAILY
Qty: 7 TABLET | Refills: 0 | Status: SHIPPED | OUTPATIENT
Start: 2022-09-11 | End: 2022-09-18

## 2022-09-11 RX ORDER — ERGOCALCIFEROL 1.25 MG/1
50000 CAPSULE ORAL
Qty: 4 CAPSULE | Refills: 0 | Status: SHIPPED | OUTPATIENT
Start: 2022-09-12 | End: 2022-09-24

## 2022-09-11 RX ORDER — ALBUTEROL SULFATE 90 UG/1
2 AEROSOL, METERED RESPIRATORY (INHALATION) EVERY 4 HOURS PRN
Qty: 2 G | Refills: 1 | Status: SHIPPED | OUTPATIENT
Start: 2022-09-11 | End: 2022-09-25

## 2022-09-11 RX ORDER — ASCORBIC ACID 500 MG
500 TABLET ORAL DAILY
Qty: 10 TABLET | Refills: 0 | Status: SHIPPED | OUTPATIENT
Start: 2022-09-11 | End: 2022-09-21

## 2022-09-11 RX ADMIN — DEXAMETHASONE 6 MG: 2 TABLET ORAL at 09:09

## 2022-09-11 RX ADMIN — OXYCODONE HYDROCHLORIDE AND ACETAMINOPHEN 500 MG: 500 TABLET ORAL at 09:09

## 2022-09-11 RX ADMIN — METOPROLOL SUCCINATE 100 MG: 50 TABLET, FILM COATED, EXTENDED RELEASE ORAL at 09:09

## 2022-09-11 RX ADMIN — ZINC SULFATE 220 MG (50 MG) CAPSULE 220 MG: CAPSULE at 09:09

## 2022-09-11 RX ADMIN — PANTOPRAZOLE SODIUM 40 MG: 40 TABLET, DELAYED RELEASE ORAL at 09:09

## 2022-09-11 RX ADMIN — DILTIAZEM HYDROCHLORIDE 120 MG: 120 CAPSULE, COATED, EXTENDED RELEASE ORAL at 09:09

## 2022-09-11 RX ADMIN — FUROSEMIDE 40 MG: 10 INJECTION, SOLUTION INTRAMUSCULAR; INTRAVENOUS at 05:09

## 2022-09-11 RX ADMIN — LEVOTHYROXINE SODIUM 125 MCG: 125 TABLET ORAL at 09:09

## 2022-09-11 NOTE — DISCHARGE SUMMARY
[facility] - 5th Floor Med Surg  [facility] Hospital Medicine  Discharge Summary      Patient Name: Jules Kwong  MRN: [id number]  Patient Class: IP- Inpatient  Admission Date: 9/8/2022  Hospital Length of Stay: 3 days  Discharge Date and Time:  09/11/2022 9:04 AM  Attending Physician: Pantera Stewart MD   Discharging Provider: Barry Packer MD  Primary Care Provider: Ankur Cruz MD    This is an 82-year-old male with medical history of Chronic HFpEF, paroxysmal atrial fibrillation/flutter not on anticoagulation, T2DM, HTN, HLD present to the ED with complaint of shortness of breath and productive cough that started about 2-3 weeks ago.  Report symptoms started with dyspnea on exertion and worsening bilateral lower extremity swelling, over the past 2 weeks started having cough productive of yellowish sputum.  Denies chest pain, fever, chills, nausea, vomiting, abdominal pain, or diarrhea.     On arrival to ED, he was afebrile, normotensive and saturating 94% on room air.  EKG showed atrial fibrillation with controlled ventricular response.  Chest x-ray show clear lung fields without opacities.  . COVID-19 positive. He was given dexamethasone and remdesivir referred to hospital medicine service for further evaluation and management.    COVID-19 treatment protocol with remdesivir, dexamethasone, antitussives, antihistaminics and supportive care was initiated.  His respiratory status continued to improve.  He was initially started on full-dose anticoagulation to suspicion for PE.  CT angiogram showed no signs of PE and anticoagulation was discontinued.  TTE showed intact EF, normal systolic function.  Other appropriate home medications were continued.  Patient insisted on going home without completing remdesivir course.  Prior to discharge all his medications were reconciled, home oxygen was arranged.  Extensive counseling was provided regarding medication adherence, outpatient follow-up.  He  verbalized understanding of all the recommendations and agreed to follow with the PCP.    COVID-19 pneumonitis  Acute on chronic heart failure with preserved ejection fraction  Paroxysmal atrial fibrillation--currently CVR, ([id number]= 5), not on AC     Hx: Obesity, T2DM, HTN, HLD         Goals of Care Treatment Preferences:  Code Status: Full Code      Consults:   Consults (From admission, onward)          Status Ordering Provider     Inpatient consult to Social Work/Case Management  Once        Provider:  (Not yet assigned)    Acknowledged FLOWER MCKINNEY            No new Assessment & Plan notes have been filed under this hospital service since the last note was generated.  Service: Hospital Medicine    Final Active Diagnoses:    Diagnosis Date Noted POA    PRINCIPAL PROBLEM:  COVID-19 [U07.1] 09/08/2022 Yes    Acute on chronic heart failure with preserved ejection fraction (HFpEF) [I50.33] 09/09/2022 Yes      Problems Resolved During this Admission:       Discharged Condition: good    Disposition: Home or Self Care    Follow Up:   Contact information for follow-up providers       Ankur Cruz MD Follow up in 1 week(s).    Specialty: Internal Medicine  Contact information:  [address] [place] [id number]  [phone]                       Contact information for after-discharge care       Durable Medical Equipment       Esteban Quan .    Service: Durable Medical Equipment  Contact information:  [phone]                                 Patient Instructions:      OXYGEN FOR HOME USE   Order Comments: 1)  Oxygen Concentrator,  portable oxygen concentrator  2) SARAH 99 months  3) Home O2 @ 2LPM via NC continuous    O2 supplies:      cannula (4ea)     tubing (5 ea)     water trap (1ea)     bubble humidifier (1 ea)     Order Specific Question Answer Comments   Liter Flow 2    Duration Continuous    Qualifying Test Performed at: Rest    Oxygen saturation: 86    Portable  "mode: pulse dose acceptable    Mode: Portable concentrator    Route nasal cannula    Device: home concentrator with portable concentrator    Length of need (in months): 99 mos    Patient condition with qualifying saturation CHF COVID 19   Height: 5' 5" (1.651 m)    Weight: 136.1 kg (300 lb 0.7 oz)    Alternative treatment measures have been tried or considered and deemed clinically ineffective. Yes        Significant Diagnostic Studies: Labs: CMP   Recent Labs   Lab 09/10/22  1119      K 4.2   CO2 24   BUN 28.9*   CREATININE 1.09   CALCIUM 9.3   ALBUMIN 3.3*   BILITOT 0.3   ALKPHOS 97   AST 31   ALT 23       Pending Diagnostic Studies:       Procedure Component Value Units Date/Time    D dimer, quantitative [[id number]] Collected: 09/08/22 1857    Order Status: Sent Lab Status: No result Updated: 09/08/22 1857    Specimen: Blood            Medications:  Reconciled Home Medications:      Medication List        START taking these medications      albuterol 90 mcg/actuation inhaler  Commonly known as: PROVENTIL/VENTOLIN HFA  Inhale 2 puffs into the lungs every 4 (four) hours as needed for Wheezing or Shortness of Breath. Rescue     ascorbic acid (vitamin C) 500 MG tablet  Commonly known as: VITAMIN C  Take 1 tablet (500 mg total) by mouth once daily. for 10 days     benzonatate 200 MG capsule  Commonly known as: TESSALON  Take 1 capsule (200 mg total) by mouth 3 (three) times daily as needed for Cough.     dexAMETHasone 6 MG tablet  Commonly known as: DECADRON  Take 1 tablet (6 mg total) by mouth once daily. for 7 days     dextromethorphan-guaiFENesin  mg/5 ml  mg/5 mL liquid  Commonly known as: ROBITUSSIN-DM  Take 5 mLs by mouth every 4 (four) hours as needed (cough).     ergocalciferol 50,000 unit Cap  Commonly known as: ERGOCALCIFEROL  Take 1 capsule (50,000 Units total) by mouth Every 3 (three) days. for 12 days  Start taking on: September 12, 2022            CONTINUE taking these medications  "     amLODIPine-benazepriL 10-40 mg per capsule  Commonly known as: LOTREL  Take 1 capsule by mouth once daily.     bumetanide 0.5 MG Tab  Commonly known as: BUMEX  Take 0.5 mg by mouth once daily.     diltiaZEM 120 MG Cp24  Commonly known as: CARDIZEM CD  Take by mouth.     glipiZIDE 10 MG Tr24  Commonly known as: GLUCOTROL  Take 10 mg by mouth 2 (two) times daily.     levothyroxine 125 MCG tablet  Commonly known as: SYNTHROID  Take 125 mcg by mouth once daily.     metoprolol succinate 100 MG 24 hr tablet  Commonly known as: TOPROL-XL  Take 100 mg by mouth 2 (two) times daily.     pantoprazole 40 MG tablet  Commonly known as: PROTONIX  Take 40 mg by mouth once daily.     pioglitazone 30 MG tablet  Commonly known as: ACTOS  Take 30 mg by mouth once daily.     simvastatin 40 MG tablet  Commonly known as: ZOCOR  Take 40 mg by mouth every evening.              Indwelling Lines/Drains at time of discharge:   Lines/Drains/Airways       None                   Time spent on the discharge of patient: 35 minutes         Barry Packer MD  Department of Hospital Medicine  [facility] - 5th Floor Med Surg

## 2022-09-11 NOTE — NURSING
Patient refused to wait for transport and wheelchair due to it taking long. Patient walked down to ED entrance to meet son in private automobile. Patient accompanied by Nurse.

## 2022-09-11 NOTE — PLAN OF CARE
09/11/22 1040   Final Note   Assessment Type Final Discharge Note   Anticipated Discharge Disposition Home   Hospital Resources/Appts/Education Provided Post-Acute resouces added to AVS   Post-Acute Status   Post-Acute Authorization HME   HME Status Referrals Sent  (FOC obtained for [facility] for home oxygen)   Discharge Delays None known at this time

## 2023-08-16 ENCOUNTER — LAB VISIT (OUTPATIENT)
Dept: LAB | Facility: HOSPITAL | Age: 83
End: 2023-08-16
Attending: INTERNAL MEDICINE
Payer: MEDICARE

## 2023-08-16 DIAGNOSIS — Z00.00 ROUTINE GENERAL MEDICAL EXAMINATION AT A HEALTH CARE FACILITY: ICD-10-CM

## 2023-08-16 DIAGNOSIS — M81.0 SENILE OSTEOPOROSIS: ICD-10-CM

## 2023-08-16 DIAGNOSIS — R94.8 NONSPECIFIC ABNORMAL RESULTS OF BASAL METABOLISM FUNCTION STUDY: Primary | ICD-10-CM

## 2023-08-16 DIAGNOSIS — E11.9 DIABETES MELLITUS WITHOUT COMPLICATION: ICD-10-CM

## 2023-08-16 DIAGNOSIS — N40.0 BENIGN PROSTATIC HYPERPLASIA, UNSPECIFIED WHETHER LOWER URINARY TRACT SYMPTOMS PRESENT: ICD-10-CM

## 2023-08-16 DIAGNOSIS — E55.9 AVITAMINOSIS D: ICD-10-CM

## 2023-08-16 DIAGNOSIS — Z12.5 SPECIAL SCREENING FOR MALIGNANT NEOPLASM OF PROSTATE: ICD-10-CM

## 2023-08-16 LAB
ALBUMIN SERPL-MCNC: 3.3 G/DL (ref 3.4–4.8)
ALBUMIN/GLOB SERPL: 0.9 RATIO (ref 1.1–2)
ALP SERPL-CCNC: 125 UNIT/L (ref 40–150)
ALT SERPL-CCNC: 11 UNIT/L (ref 0–55)
AST SERPL-CCNC: 15 UNIT/L (ref 5–34)
BASOPHILS # BLD AUTO: 0.06 X10(3)/MCL
BASOPHILS NFR BLD AUTO: 1 %
BILIRUB SERPL-MCNC: 0.6 MG/DL
BUN SERPL-MCNC: 10.6 MG/DL (ref 8.4–25.7)
CALCIUM SERPL-MCNC: 8.8 MG/DL (ref 8.8–10)
CHLORIDE SERPL-SCNC: 109 MMOL/L (ref 98–107)
CHOLEST SERPL-MCNC: 173 MG/DL
CHOLEST/HDLC SERPL: 6 {RATIO} (ref 0–5)
CO2 SERPL-SCNC: 26 MMOL/L (ref 23–31)
CREAT SERPL-MCNC: 0.98 MG/DL (ref 0.73–1.18)
DEPRECATED CALCIDIOL+CALCIFEROL SERPL-MC: 25.9 NG/ML (ref 30–80)
EOSINOPHIL # BLD AUTO: 0.2 X10(3)/MCL (ref 0–0.9)
EOSINOPHIL NFR BLD AUTO: 3.5 %
ERYTHROCYTE [DISTWIDTH] IN BLOOD BY AUTOMATED COUNT: 19.5 % (ref 11.5–17)
EST. AVERAGE GLUCOSE BLD GHB EST-MCNC: 122.6 MG/DL
GFR SERPLBLD CREATININE-BSD FMLA CKD-EPI: >60 MLS/MIN/1.73/M2
GLOBULIN SER-MCNC: 3.6 GM/DL (ref 2.4–3.5)
GLUCOSE SERPL-MCNC: 120 MG/DL (ref 82–115)
HBA1C MFR BLD: 5.9 %
HCT VFR BLD AUTO: 30.1 % (ref 42–52)
HDLC SERPL-MCNC: 30 MG/DL (ref 35–60)
HGB BLD-MCNC: 8.5 G/DL (ref 14–18)
IMM GRANULOCYTES # BLD AUTO: 0.03 X10(3)/MCL (ref 0–0.04)
IMM GRANULOCYTES NFR BLD AUTO: 0.5 %
LDLC SERPL CALC-MCNC: 118 MG/DL (ref 50–140)
LYMPHOCYTES # BLD AUTO: 1.11 X10(3)/MCL (ref 0.6–4.6)
LYMPHOCYTES NFR BLD AUTO: 19.3 %
MCH RBC QN AUTO: 21.5 PG (ref 27–31)
MCHC RBC AUTO-ENTMCNC: 28.2 G/DL (ref 33–36)
MCV RBC AUTO: 76 FL (ref 80–94)
MONOCYTES # BLD AUTO: 0.55 X10(3)/MCL (ref 0.1–1.3)
MONOCYTES NFR BLD AUTO: 9.5 %
NEUTROPHILS # BLD AUTO: 3.81 X10(3)/MCL (ref 2.1–9.2)
NEUTROPHILS NFR BLD AUTO: 66.2 %
NRBC BLD AUTO-RTO: 0 %
PLATELET # BLD AUTO: 302 X10(3)/MCL (ref 130–400)
PMV BLD AUTO: 8.9 FL (ref 7.4–10.4)
POTASSIUM SERPL-SCNC: 4.2 MMOL/L (ref 3.5–5.1)
PROT SERPL-MCNC: 6.9 GM/DL (ref 5.8–7.6)
PSA SERPL-MCNC: 1.81 NG/ML
RBC # BLD AUTO: 3.96 X10(6)/MCL (ref 4.7–6.1)
SODIUM SERPL-SCNC: 141 MMOL/L (ref 136–145)
T4 FREE SERPL-MCNC: 0.93 NG/DL (ref 0.7–1.48)
TRIGL SERPL-MCNC: 126 MG/DL (ref 34–140)
TSH SERPL-ACNC: 1.96 UIU/ML (ref 0.35–4.94)
VLDLC SERPL CALC-MCNC: 25 MG/DL
WBC # SPEC AUTO: 5.76 X10(3)/MCL (ref 4.5–11.5)

## 2023-08-16 PROCEDURE — 84439 ASSAY OF FREE THYROXINE: CPT

## 2023-08-16 PROCEDURE — 80053 COMPREHEN METABOLIC PANEL: CPT

## 2023-08-16 PROCEDURE — 84153 ASSAY OF PSA TOTAL: CPT

## 2023-08-16 PROCEDURE — 85025 COMPLETE CBC W/AUTO DIFF WBC: CPT

## 2023-08-16 PROCEDURE — 80061 LIPID PANEL: CPT

## 2023-08-16 PROCEDURE — 36415 COLL VENOUS BLD VENIPUNCTURE: CPT

## 2023-08-16 PROCEDURE — 83036 HEMOGLOBIN GLYCOSYLATED A1C: CPT

## 2023-08-16 PROCEDURE — 86431 RHEUMATOID FACTOR QUANT: CPT | Mod: 59

## 2023-08-16 PROCEDURE — 84443 ASSAY THYROID STIM HORMONE: CPT

## 2023-08-16 PROCEDURE — 82306 VITAMIN D 25 HYDROXY: CPT

## 2023-08-17 LAB
CYCLIC CITRULLINATED PEPTIDE (CCP) (OHS): 1.3 U/ML
RHEUMATOID FACTOR IGA QUANTITATIVE (OLG): 2.1 IU/ML
RHEUMATOID FACTOR IGM QUANTITATIVE (OLG): 1.3 IU/ML

## 2023-10-31 ENCOUNTER — LAB VISIT (OUTPATIENT)
Dept: LAB | Facility: HOSPITAL | Age: 83
End: 2023-10-31
Attending: INTERNAL MEDICINE
Payer: MEDICARE

## 2023-10-31 DIAGNOSIS — D64.9 ANEMIA, UNSPECIFIED TYPE: Primary | ICD-10-CM

## 2023-10-31 LAB
ERYTHROCYTE [DISTWIDTH] IN BLOOD BY AUTOMATED COUNT: 20.3 % (ref 11.5–17)
HCT VFR BLD AUTO: 25.8 % (ref 42–52)
HGB BLD-MCNC: 6.5 G/DL (ref 14–18)
MCH RBC QN AUTO: 18.4 PG (ref 27–31)
MCHC RBC AUTO-ENTMCNC: 25.2 G/DL (ref 33–36)
MCV RBC AUTO: 72.9 FL (ref 80–94)
NRBC BLD AUTO-RTO: 0.3 %
PLATELET # BLD AUTO: 229 X10(3)/MCL (ref 130–400)
PMV BLD AUTO: 9.9 FL (ref 7.4–10.4)
RBC # BLD AUTO: 3.54 X10(6)/MCL (ref 4.7–6.1)
WBC # SPEC AUTO: 6.12 X10(3)/MCL (ref 4.5–11.5)

## 2023-10-31 PROCEDURE — 85027 COMPLETE CBC AUTOMATED: CPT

## 2023-10-31 PROCEDURE — 36415 COLL VENOUS BLD VENIPUNCTURE: CPT

## 2023-11-01 ENCOUNTER — INFUSION (OUTPATIENT)
Dept: INFUSION THERAPY | Facility: HOSPITAL | Age: 83
End: 2023-11-01
Attending: INTERNAL MEDICINE
Payer: MEDICARE

## 2023-11-01 VITALS
SYSTOLIC BLOOD PRESSURE: 180 MMHG | TEMPERATURE: 98 F | RESPIRATION RATE: 18 BRPM | OXYGEN SATURATION: 94 % | DIASTOLIC BLOOD PRESSURE: 77 MMHG | HEART RATE: 60 BPM

## 2023-11-01 DIAGNOSIS — D64.9 ANEMIA, UNSPECIFIED TYPE: Primary | ICD-10-CM

## 2023-11-01 PROCEDURE — P9016 RBC LEUKOCYTES REDUCED: HCPCS | Performed by: INTERNAL MEDICINE

## 2023-11-01 PROCEDURE — 25000003 PHARM REV CODE 250: Performed by: INTERNAL MEDICINE

## 2023-11-01 PROCEDURE — 36430 TRANSFUSION BLD/BLD COMPNT: CPT

## 2023-11-01 PROCEDURE — 86923 COMPATIBILITY TEST ELECTRIC: CPT | Performed by: INTERNAL MEDICINE

## 2023-11-01 RX ORDER — METOPROLOL TARTRATE 100 MG/1
TABLET ORAL
COMMUNITY
Start: 2023-06-02

## 2023-11-01 RX ORDER — LEVOTHYROXINE SODIUM 125 UG/1
1 TABLET ORAL EVERY MORNING
COMMUNITY

## 2023-11-01 RX ORDER — GLIPIZIDE 10 MG/1
1 TABLET ORAL EVERY MORNING
COMMUNITY

## 2023-11-01 RX ORDER — ALBUTEROL SULFATE 90 UG/1
2 AEROSOL, METERED RESPIRATORY (INHALATION) EVERY 4 HOURS PRN
COMMUNITY
Start: 2023-05-30

## 2023-11-01 RX ORDER — SIMVASTATIN 20 MG/1
1 TABLET, FILM COATED ORAL NIGHTLY
COMMUNITY

## 2023-11-01 RX ORDER — LISINOPRIL 20 MG/1
20 TABLET ORAL 2 TIMES DAILY
COMMUNITY
Start: 2023-10-30

## 2023-11-01 RX ORDER — LISINOPRIL 20 MG/1
20 TABLET ORAL
COMMUNITY
Start: 2022-11-14

## 2023-11-01 RX ORDER — PANTOPRAZOLE SODIUM 40 MG/1
40 TABLET, DELAYED RELEASE ORAL
COMMUNITY
Start: 2023-05-10

## 2023-11-01 RX ORDER — PREDNISONE 20 MG/1
TABLET ORAL
COMMUNITY
Start: 2023-05-15

## 2023-11-01 RX ORDER — HYDROCODONE BITARTRATE AND ACETAMINOPHEN 500; 5 MG/1; MG/1
TABLET ORAL ONCE
Status: COMPLETED | OUTPATIENT
Start: 2023-11-01 | End: 2023-11-02

## 2023-11-01 RX ORDER — SODIUM CHLORIDE 0.9 % (FLUSH) 0.9 %
10 SYRINGE (ML) INJECTION
Status: ACTIVE | OUTPATIENT
Start: 2023-11-01

## 2023-11-01 RX ORDER — FERROUS SULFATE TAB 325 MG (65 MG ELEMENTAL FE) 325 (65 FE) MG
TAB ORAL
COMMUNITY
Start: 2023-05-10

## 2023-11-01 RX ORDER — LOSARTAN POTASSIUM 100 MG/1
100 TABLET ORAL
COMMUNITY
Start: 2023-09-05

## 2023-11-01 RX ORDER — HYDROCORTISONE 20 MG/1
TABLET ORAL
COMMUNITY
Start: 2023-08-21

## 2023-11-01 RX ADMIN — SODIUM CHLORIDE: 9 INJECTION, SOLUTION INTRAVENOUS at 02:11

## 2023-11-01 NOTE — PLAN OF CARE
Pt arrived for 2 units of blood.  Assessment done and treatment reviewed with pt states agreement.  Blood consent signed.  PIV started to right hand.  We were only able to get 1 unit of blood in today and pt will RTC tomorrow for 2nd unit.  Pt tolerated infusion without issue.  PIV left in per pt's request, wrapped securily.  Pt discharged to home per wheelchair and Knows to RTC tomorrow fof 9 am.

## 2023-11-02 ENCOUNTER — INFUSION (OUTPATIENT)
Dept: INFUSION THERAPY | Facility: HOSPITAL | Age: 83
End: 2023-11-02
Attending: INTERNAL MEDICINE
Payer: MEDICARE

## 2023-11-02 VITALS
HEART RATE: 60 BPM | BODY MASS INDEX: 49.92 KG/M2 | OXYGEN SATURATION: 95 % | SYSTOLIC BLOOD PRESSURE: 143 MMHG | TEMPERATURE: 99 F | DIASTOLIC BLOOD PRESSURE: 68 MMHG | WEIGHT: 300 LBS

## 2023-11-02 DIAGNOSIS — D64.9 ANEMIA, UNSPECIFIED TYPE: Primary | ICD-10-CM

## 2023-11-02 LAB
ABO + RH BLD: NORMAL
ABO + RH BLD: NORMAL
BLD PROD TYP BPU: NORMAL
BLD PROD TYP BPU: NORMAL
BLOOD UNIT EXPIRATION DATE: NORMAL
BLOOD UNIT EXPIRATION DATE: NORMAL
BLOOD UNIT TYPE CODE: 6200
BLOOD UNIT TYPE CODE: 6200
CROSSMATCH INTERPRETATION: NORMAL
CROSSMATCH INTERPRETATION: NORMAL
DISPENSE STATUS: NORMAL
DISPENSE STATUS: NORMAL
UNIT NUMBER: NORMAL
UNIT NUMBER: NORMAL

## 2023-11-02 PROCEDURE — 25000003 PHARM REV CODE 250: Performed by: INTERNAL MEDICINE

## 2023-11-02 PROCEDURE — 36430 TRANSFUSION BLD/BLD COMPNT: CPT

## 2023-11-02 PROCEDURE — P9016 RBC LEUKOCYTES REDUCED: HCPCS | Performed by: INTERNAL MEDICINE

## 2023-11-02 RX ORDER — HYDROCODONE BITARTRATE AND ACETAMINOPHEN 500; 5 MG/1; MG/1
TABLET ORAL
Status: ACTIVE | OUTPATIENT
Start: 2023-11-02

## 2023-12-05 ENCOUNTER — LAB VISIT (OUTPATIENT)
Dept: LAB | Facility: HOSPITAL | Age: 83
End: 2023-12-05
Attending: STUDENT IN AN ORGANIZED HEALTH CARE EDUCATION/TRAINING PROGRAM
Payer: MEDICARE

## 2023-12-05 DIAGNOSIS — D50.0 IRON DEFICIENCY ANEMIA DUE TO CHRONIC BLOOD LOSS: Primary | ICD-10-CM

## 2023-12-05 LAB
BASOPHILS # BLD AUTO: 0.06 X10(3)/MCL
BASOPHILS NFR BLD AUTO: 0.8 %
EOSINOPHIL # BLD AUTO: 0.17 X10(3)/MCL (ref 0–0.9)
EOSINOPHIL NFR BLD AUTO: 2.3 %
ERYTHROCYTE [DISTWIDTH] IN BLOOD BY AUTOMATED COUNT: 22.2 % (ref 11.5–17)
FERRITIN SERPL-MCNC: 23.03 NG/ML (ref 21.81–274.66)
HCT VFR BLD AUTO: 34.1 % (ref 42–52)
HGB BLD-MCNC: 9.3 G/DL (ref 14–18)
IMM GRANULOCYTES # BLD AUTO: 0.03 X10(3)/MCL (ref 0–0.04)
IMM GRANULOCYTES NFR BLD AUTO: 0.4 %
IRON SATN MFR SERPL: 6 % (ref 20–50)
IRON SERPL-MCNC: 23 UG/DL (ref 65–175)
LYMPHOCYTES # BLD AUTO: 1.19 X10(3)/MCL (ref 0.6–4.6)
LYMPHOCYTES NFR BLD AUTO: 16.2 %
MCH RBC QN AUTO: 21 PG (ref 27–31)
MCHC RBC AUTO-ENTMCNC: 27.3 G/DL (ref 33–36)
MCV RBC AUTO: 77.1 FL (ref 80–94)
MONOCYTES # BLD AUTO: 0.69 X10(3)/MCL (ref 0.1–1.3)
MONOCYTES NFR BLD AUTO: 9.4 %
NEUTROPHILS # BLD AUTO: 5.19 X10(3)/MCL (ref 2.1–9.2)
NEUTROPHILS NFR BLD AUTO: 70.9 %
NRBC BLD AUTO-RTO: 0 %
PLATELET # BLD AUTO: 290 X10(3)/MCL (ref 130–400)
PMV BLD AUTO: 9.1 FL (ref 7.4–10.4)
RBC # BLD AUTO: 4.42 X10(6)/MCL (ref 4.7–6.1)
TIBC SERPL-MCNC: 334 UG/DL (ref 69–240)
TIBC SERPL-MCNC: 357 UG/DL (ref 250–450)
TRANSFERRIN SERPL-MCNC: 357 MG/DL
WBC # SPEC AUTO: 7.33 X10(3)/MCL (ref 4.5–11.5)

## 2023-12-05 PROCEDURE — 82728 ASSAY OF FERRITIN: CPT

## 2023-12-05 PROCEDURE — 85025 COMPLETE CBC W/AUTO DIFF WBC: CPT

## 2023-12-05 PROCEDURE — 36415 COLL VENOUS BLD VENIPUNCTURE: CPT

## 2023-12-05 PROCEDURE — 83540 ASSAY OF IRON: CPT

## 2024-10-31 ENCOUNTER — LAB VISIT (OUTPATIENT)
Dept: LAB | Facility: HOSPITAL | Age: 84
End: 2024-10-31
Attending: INTERNAL MEDICINE
Payer: MEDICARE

## 2024-10-31 DIAGNOSIS — E03.9 PRIMARY HYPOTHYROIDISM: ICD-10-CM

## 2024-10-31 DIAGNOSIS — Z00.00 ROUTINE GENERAL MEDICAL EXAMINATION AT A HEALTH CARE FACILITY: ICD-10-CM

## 2024-10-31 DIAGNOSIS — E11.9 DIABETES MELLITUS WITHOUT COMPLICATION: ICD-10-CM

## 2024-10-31 DIAGNOSIS — E78.5 HYPERLIPIDEMIA, UNSPECIFIED HYPERLIPIDEMIA TYPE: ICD-10-CM

## 2024-10-31 DIAGNOSIS — Z12.5 SPECIAL SCREENING FOR MALIGNANT NEOPLASM OF PROSTATE: ICD-10-CM

## 2024-10-31 DIAGNOSIS — N40.0 BENIGN PROSTATIC HYPERPLASIA, UNSPECIFIED WHETHER LOWER URINARY TRACT SYMPTOMS PRESENT: ICD-10-CM

## 2024-10-31 DIAGNOSIS — I10 ESSENTIAL HYPERTENSION, MALIGNANT: ICD-10-CM

## 2024-10-31 DIAGNOSIS — R94.8 NONSPECIFIC ABNORMAL RESULTS OF BASAL METABOLISM FUNCTION STUDY: Primary | ICD-10-CM

## 2024-10-31 LAB
ALBUMIN SERPL-MCNC: 3.4 G/DL (ref 3.4–4.8)
ALBUMIN/GLOB SERPL: 1 RATIO (ref 1.1–2)
ALP SERPL-CCNC: 161 UNIT/L (ref 40–150)
ALT SERPL-CCNC: 23 UNIT/L (ref 0–55)
ANION GAP SERPL CALC-SCNC: 9 MEQ/L
AST SERPL-CCNC: 22 UNIT/L (ref 5–34)
BASOPHILS # BLD AUTO: 0.04 X10(3)/MCL
BASOPHILS NFR BLD AUTO: 0.6 %
BILIRUB SERPL-MCNC: 0.4 MG/DL
BUN SERPL-MCNC: 14.1 MG/DL (ref 8.4–25.7)
CALCIUM SERPL-MCNC: 9 MG/DL (ref 8.8–10)
CHLORIDE SERPL-SCNC: 108 MMOL/L (ref 98–107)
CHOLEST SERPL-MCNC: 221 MG/DL
CHOLEST/HDLC SERPL: 6 {RATIO} (ref 0–5)
CO2 SERPL-SCNC: 25 MMOL/L (ref 23–31)
CREAT SERPL-MCNC: 1.02 MG/DL (ref 0.72–1.25)
CREAT/UREA NIT SERPL: 14
EOSINOPHIL # BLD AUTO: 0.2 X10(3)/MCL (ref 0–0.9)
EOSINOPHIL NFR BLD AUTO: 3 %
ERYTHROCYTE [DISTWIDTH] IN BLOOD BY AUTOMATED COUNT: 16.1 % (ref 11.5–17)
ERYTHROCYTE [SEDIMENTATION RATE] IN BLOOD: 38 MM/HR (ref 0–15)
EST. AVERAGE GLUCOSE BLD GHB EST-MCNC: 188.6 MG/DL
GFR SERPLBLD CREATININE-BSD FMLA CKD-EPI: >60 ML/MIN/1.73/M2
GLOBULIN SER-MCNC: 3.4 GM/DL (ref 2.4–3.5)
GLUCOSE SERPL-MCNC: 181 MG/DL (ref 82–115)
HBA1C MFR BLD: 8.2 %
HCT VFR BLD AUTO: 39.9 % (ref 42–52)
HDLC SERPL-MCNC: 40 MG/DL (ref 35–60)
HGB BLD-MCNC: 11.6 G/DL (ref 14–18)
IMM GRANULOCYTES # BLD AUTO: 0.02 X10(3)/MCL (ref 0–0.04)
IMM GRANULOCYTES NFR BLD AUTO: 0.3 %
LDLC SERPL CALC-MCNC: 139 MG/DL (ref 50–140)
LYMPHOCYTES # BLD AUTO: 1.27 X10(3)/MCL (ref 0.6–4.6)
LYMPHOCYTES NFR BLD AUTO: 19.2 %
MCH RBC QN AUTO: 24.2 PG (ref 27–31)
MCHC RBC AUTO-ENTMCNC: 29.1 G/DL (ref 33–36)
MCV RBC AUTO: 83.3 FL (ref 80–94)
MONOCYTES # BLD AUTO: 0.78 X10(3)/MCL (ref 0.1–1.3)
MONOCYTES NFR BLD AUTO: 11.8 %
NEUTROPHILS # BLD AUTO: 4.29 X10(3)/MCL (ref 2.1–9.2)
NEUTROPHILS NFR BLD AUTO: 65.1 %
NRBC BLD AUTO-RTO: 0 %
PLATELET # BLD AUTO: 238 X10(3)/MCL (ref 130–400)
PMV BLD AUTO: 9.1 FL (ref 7.4–10.4)
POTASSIUM SERPL-SCNC: 4.4 MMOL/L (ref 3.5–5.1)
PROT SERPL-MCNC: 6.8 GM/DL (ref 5.8–7.6)
PSA SERPL-MCNC: 2.14 NG/ML
RBC # BLD AUTO: 4.79 X10(6)/MCL (ref 4.7–6.1)
RHEUMATOID FACT SERPL-ACNC: <13 IU/ML
SODIUM SERPL-SCNC: 142 MMOL/L (ref 136–145)
T4 FREE SERPL-MCNC: 0.73 NG/DL (ref 0.7–1.48)
TRIGL SERPL-MCNC: 209 MG/DL (ref 34–140)
TSH SERPL-ACNC: 2.17 UIU/ML (ref 0.35–4.94)
URATE SERPL-MCNC: 6.4 MG/DL (ref 3.5–7.2)
VLDLC SERPL CALC-MCNC: 42 MG/DL
WBC # BLD AUTO: 6.6 X10(3)/MCL (ref 4.5–11.5)

## 2024-10-31 PROCEDURE — 85652 RBC SED RATE AUTOMATED: CPT

## 2024-10-31 PROCEDURE — 84550 ASSAY OF BLOOD/URIC ACID: CPT

## 2024-10-31 PROCEDURE — 84439 ASSAY OF FREE THYROXINE: CPT

## 2024-10-31 PROCEDURE — 86431 RHEUMATOID FACTOR QUANT: CPT

## 2024-10-31 PROCEDURE — 83036 HEMOGLOBIN GLYCOSYLATED A1C: CPT

## 2024-10-31 PROCEDURE — 84443 ASSAY THYROID STIM HORMONE: CPT

## 2024-10-31 PROCEDURE — 86431 RHEUMATOID FACTOR QUANT: CPT | Mod: 59

## 2024-10-31 PROCEDURE — 86039 ANTINUCLEAR ANTIBODIES (ANA): CPT

## 2024-10-31 PROCEDURE — 80061 LIPID PANEL: CPT

## 2024-10-31 PROCEDURE — 84153 ASSAY OF PSA TOTAL: CPT

## 2024-10-31 PROCEDURE — 36415 COLL VENOUS BLD VENIPUNCTURE: CPT

## 2024-10-31 PROCEDURE — 85025 COMPLETE CBC W/AUTO DIFF WBC: CPT

## 2024-10-31 PROCEDURE — 80053 COMPREHEN METABOLIC PANEL: CPT

## 2024-11-01 LAB
RHEUMATOID FACTOR IGA QUANTITATIVE (OLG): 4 IU/ML
RHEUMATOID FACTOR IGM QUANTITATIVE (OLG): 1.4 IU/ML

## 2024-11-04 LAB — ANA SER QL HEP2 SUBST: NORMAL
